# Patient Record
Sex: FEMALE | Race: WHITE | NOT HISPANIC OR LATINO | Employment: UNEMPLOYED | URBAN - METROPOLITAN AREA
[De-identification: names, ages, dates, MRNs, and addresses within clinical notes are randomized per-mention and may not be internally consistent; named-entity substitution may affect disease eponyms.]

---

## 2018-11-30 ENCOUNTER — OFFICE VISIT CONVERTED (OUTPATIENT)
Dept: SURGERY | Facility: CLINIC | Age: 43
End: 2018-11-30
Attending: UROLOGY

## 2021-05-16 VITALS — RESPIRATION RATE: 16 BRPM | BODY MASS INDEX: 22.58 KG/M2 | HEIGHT: 60 IN | WEIGHT: 115 LBS

## 2022-10-25 ENCOUNTER — OFFICE VISIT (OUTPATIENT)
Dept: FAMILY MEDICINE CLINIC | Facility: CLINIC | Age: 47
End: 2022-10-25

## 2022-10-25 VITALS
HEART RATE: 73 BPM | TEMPERATURE: 98 F | OXYGEN SATURATION: 99 % | RESPIRATION RATE: 20 BRPM | WEIGHT: 113.1 LBS | DIASTOLIC BLOOD PRESSURE: 84 MMHG | SYSTOLIC BLOOD PRESSURE: 132 MMHG | HEIGHT: 60 IN | BODY MASS INDEX: 22.2 KG/M2

## 2022-10-25 DIAGNOSIS — F41.9 ANXIETY: ICD-10-CM

## 2022-10-25 DIAGNOSIS — Z13.220 SCREENING FOR LIPID DISORDERS: ICD-10-CM

## 2022-10-25 DIAGNOSIS — Z76.89 ESTABLISHING CARE WITH NEW DOCTOR, ENCOUNTER FOR: Primary | ICD-10-CM

## 2022-10-25 DIAGNOSIS — N95.1 MENOPAUSAL SYMPTOMS: ICD-10-CM

## 2022-10-25 DIAGNOSIS — Z12.31 ENCOUNTER FOR SCREENING MAMMOGRAM FOR MALIGNANT NEOPLASM OF BREAST: ICD-10-CM

## 2022-10-25 DIAGNOSIS — Z87.442 HISTORY OF KIDNEY STONES: ICD-10-CM

## 2022-10-25 DIAGNOSIS — Z01.89 ROUTINE LAB DRAW: ICD-10-CM

## 2022-10-25 PROCEDURE — 99204 OFFICE O/P NEW MOD 45 MIN: CPT

## 2022-10-25 RX ORDER — CITALOPRAM 10 MG/1
5 TABLET ORAL DAILY
Qty: 45 TABLET | Refills: 1 | Status: SHIPPED | OUTPATIENT
Start: 2022-10-25

## 2022-10-25 NOTE — ASSESSMENT & PLAN NOTE
Chronic issue.  Went through menopause in her late 30s.  Still has hot flashes, relatively controlled.

## 2022-10-25 NOTE — ASSESSMENT & PLAN NOTE
Uncontrolled.  We will start back on Celexa at 5 mg.  Discussed with patient may take 3 to 4 weeks to get in system to notice a difference, may consider increasing in future if needed.  Discussed good coping mechanisms, social support, and other ways to deal with anxiety as well.

## 2022-10-25 NOTE — PROGRESS NOTES
Teo Nation presents to [facility] with complaints of worsening anxiety, patient's also here to establish as a new patient.      History of Present Illness  This is a 46-year-old female, past medical history significant for kidney stones, tubal ligation, anxiety, D&C, who presents to clinic today with complaints of worsening anxiety.    Kidney stones: states that she has had a lot of issues with kidney stones in the past, her main time that she had really severe issues was in 2015 in 2016.  States that she had to have 2 rounds of stents being placed because the kidney stones were so large that they could not pass on their own.  States that the last time she had an issue with a kidney stone was back in 2019, it was able to pass on its own though.  States that she tries to stay very hydrated, limits a lot of her calcium intake as the kidney stone that she is had the past have been made of calcium.  States that she not had a lot of issues recently, but does want to mention this as being part of her history.    Anxiety: States that she is always had some anxiety, states that its been going on since the early 2000's.  Originally was placed on Celexa, states that she is only able to tolerate 5 mg as the 10 mg dose was too strong.  States that it really helped her, helped her process anxiety and more appropriate level, states that right now she is got a lot of things going on in her life.  States that her mother-in-law recently passed away, she is had a lot going on with taking care of different family members, and just feels like she is overwhelmed all the time, she is constantly stressed, it is affecting her marriage and her relationships with some of her family members.  Thinks that she needs to go back on the Celexa as it really helped her, does have a good support system, but thinks that she needs this just to help her.  No other acute issues at this time.    Has not had blood  "work done in several years, okay with us obtaining.  Order mammogram today.  We will do annual physical and Pap smear next visit.  We will also discuss Cologuard/colonoscopy at next visit as she will be 47 then.  Otherwise, is up-to-date on preventative screenings.  Does not wish to have any of the vaccines/immunizations at this time.    Past Medical History:   Diagnosis Date   • Anxiety 2018   • Kidney stone 2015     Past Surgical History:   • APPENDECTOMY   • TONSILLECTOMY   • TUBAL ABDOMINAL LIGATION       Social History     Socioeconomic History   • Marital status:    Tobacco Use   • Smoking status: Every Day     Packs/day: 1.00     Years: 10.00     Pack years: 10.00     Types: Cigarettes   • Smokeless tobacco: Never   Vaping Use   • Vaping Use: Never used   Substance and Sexual Activity   • Alcohol use: Not Currently   • Drug use: Never   • Sexual activity: Yes     Partners: Male     Birth control/protection: Post-menopausal     Socioeconomic History   • Marital status:    Tobacco Use   • Smoking status: Every Day     Packs/day: 1.00     Years: 10.00     Pack years: 10.00     Types: Cigarettes   • Smokeless tobacco: Never   Vaping Use   • Vaping Use: Never used   Substance and Sexual Activity   • Alcohol use: Not Currently   • Drug use: Never   • Sexual activity: Yes     Partners: Male     Birth control/protection: Post-menopausal      Problem Relation Age of Onset   • Anxiety disorder Mother    • Arthritis Mother    • COPD Father          Objective   Vital Signs:   /84   Pulse 73   Temp 98 °F (36.7 °C) (Oral)   Resp 20   Ht 152.4 cm (60\")   Wt 51.3 kg (113 lb 1.6 oz)   SpO2 99%   BMI 22.09 kg/m²     Body mass index is 22.09 kg/m².    All labs, imaging, test results, and specialty provider notes reviewed with patient.       Physical Exam  Vitals reviewed.   Constitutional:       Appearance: Normal appearance.   Cardiovascular:      Rate and Rhythm: Normal rate and regular rhythm.      " Pulses: Normal pulses.      Heart sounds: Normal heart sounds.   Pulmonary:      Effort: Pulmonary effort is normal.      Breath sounds: Normal breath sounds.   Neurological:      General: No focal deficit present.      Mental Status: She is alert and oriented to person, place, and time.            Assessment and Plan:  Diagnoses and all orders for this visit:    1. Establishing care with new doctor, encounter for (Primary)    2. History of kidney stones  Assessment & Plan:  No acute issues.  Discussed continuing increased water intake, avoiding foods high in calcium.      3. Menopausal symptoms  Assessment & Plan:  Chronic issue.  Went through menopause in her late 30s.  Still has hot flashes, relatively controlled.      4. Anxiety  Assessment & Plan:  Uncontrolled.  We will start back on Celexa at 5 mg.  Discussed with patient may take 3 to 4 weeks to get in system to notice a difference, may consider increasing in future if needed.  Discussed good coping mechanisms, social support, and other ways to deal with anxiety as well.    Orders:  -     citalopram (CeleXA) 10 MG tablet; Take 0.5 tablets by mouth Daily.  Dispense: 45 tablet; Refill: 1    5. Routine lab draw  -     CBC Auto Differential; Future  -     Comprehensive Metabolic Panel; Future  -     TSH Rfx On Abnormal To Free T4; Future  -     Urinalysis With Culture If Indicated -; Future    6. Screening for lipid disorders  -     Lipid Panel; Future    7. Encounter for screening mammogram for malignant neoplasm of breast  -     Mammo Screening Digital Tomosynthesis Bilateral With CAD; Future        Follow Up:  Return in about 3 months (around 1/25/2023) for Pap Smear, Annual physical.    Patient was given instructions and counseling regarding her condition or for health maintenance advice. Please see specific information pulled into the AVS if appropriate.

## 2022-10-28 ENCOUNTER — PATIENT ROUNDING (BHMG ONLY) (OUTPATIENT)
Dept: FAMILY MEDICINE CLINIC | Facility: CLINIC | Age: 47
End: 2022-10-28

## 2022-10-28 NOTE — PROGRESS NOTES
October 28, 2022    Hello, may I speak with Madelaine Nation?    My name is Yessy       I am  with [facility]  [facility]  [address]   [place] [address] [phone].    Before we get started may I verify your date of birth? 1975    I am calling to officially welcome you to our practice and ask about your recent visit. Is this a good time to talk? yes    Tell me about your visit with us. What things went well?  everything was good.  Staff was helpful       We're always looking for ways to make our patients' experiences even better. Do you have recommendations on ways we may improve?  no    Overall were you satisfied with your first visit to our practice? yes       I appreciate you taking the time to speak with me today. Is there anything else I can do for you? no      Thank you, and have a great day.

## 2022-11-08 ENCOUNTER — OFFICE VISIT (OUTPATIENT)
Dept: FAMILY MEDICINE CLINIC | Facility: CLINIC | Age: 47
End: 2022-11-08

## 2022-11-08 VITALS
DIASTOLIC BLOOD PRESSURE: 72 MMHG | HEART RATE: 93 BPM | RESPIRATION RATE: 17 BRPM | TEMPERATURE: 98 F | SYSTOLIC BLOOD PRESSURE: 126 MMHG | OXYGEN SATURATION: 96 %

## 2022-11-08 DIAGNOSIS — Z01.89 ROUTINE LAB DRAW: ICD-10-CM

## 2022-11-08 DIAGNOSIS — N30.00 ACUTE CYSTITIS WITHOUT HEMATURIA: Primary | ICD-10-CM

## 2022-11-08 DIAGNOSIS — R82.90 FOUL SMELLING URINE: ICD-10-CM

## 2022-11-08 LAB
BILIRUB BLD-MCNC: NEGATIVE MG/DL
CLARITY, POC: ABNORMAL
COLOR UR: YELLOW
EXPIRATION DATE: ABNORMAL
GLUCOSE UR STRIP-MCNC: NEGATIVE MG/DL
KETONES UR QL: ABNORMAL
LEUKOCYTE EST, POC: ABNORMAL
Lab: ABNORMAL
NITRITE UR-MCNC: POSITIVE MG/ML
PH UR: 5.5 [PH] (ref 5–8)
PROT UR STRIP-MCNC: ABNORMAL MG/DL
RBC # UR STRIP: ABNORMAL /UL
SP GR UR: 1.02 (ref 1–1.03)
UROBILINOGEN UR QL: ABNORMAL

## 2022-11-08 PROCEDURE — 99213 OFFICE O/P EST LOW 20 MIN: CPT

## 2022-11-08 PROCEDURE — 87186 SC STD MICRODIL/AGAR DIL: CPT

## 2022-11-08 PROCEDURE — 81003 URINALYSIS AUTO W/O SCOPE: CPT

## 2022-11-08 PROCEDURE — 87086 URINE CULTURE/COLONY COUNT: CPT

## 2022-11-08 PROCEDURE — 87088 URINE BACTERIA CULTURE: CPT

## 2022-11-08 RX ORDER — CEPHALEXIN 500 MG/1
500 CAPSULE ORAL 2 TIMES DAILY
Qty: 14 CAPSULE | Refills: 0 | Status: SHIPPED | OUTPATIENT
Start: 2022-11-08 | End: 2022-12-05

## 2022-11-08 NOTE — PROGRESS NOTES
Madelaine Nation presents to [facility] with complaints of foul odor to urine, fever, concern for UTI.      History of Present Illness  This is a 46-year female who presents to clinic with complaints of foul odor to urine, fever, and concern for UTI.      States that typically when she gets a UTI, she does not have the typical symptoms of burning with urination, frequency with urination, but typically gets a fever ayour son he does have a thing nd will have a really foul odor to her urine.  States that she developed this a day or 2 ago, knew that she likely had a UTI, came in for evaluation today.  Denies any lower back pain, denies any pelvic pain.  Denies any blood in urine.  Denies any other associated symptoms.  Has been taking ibuprofen/Tylenol over-the-counter for management of fever.    The following portions of the patient's history were personally reviewed and updated as appropriate: allergies, current medications, past medical history, past surgical history, past family history, and past social history.       Objective   Vital Signs:   /72   Pulse 93   Temp 98 °F (36.7 °C)   Resp 17   SpO2 96%     There is no height or weight on file to calculate BMI.    All labs, imaging, test results, and specialty provider notes reviewed with patient.     Physical Exam  Vitals reviewed.   Constitutional:       Appearance: Normal appearance.   Cardiovascular:      Rate and Rhythm: Normal rate and regular rhythm.      Pulses: Normal pulses.      Heart sounds: Normal heart sounds.   Pulmonary:      Effort: Pulmonary effort is normal.      Breath sounds: Normal breath sounds.   Neurological:      General: No focal deficit present.      Mental Status: She is alert and oriented to person, place, and time.            Assessment and Plan:  Diagnoses and all orders for this visit:    1. Acute cystitis without hematuria (Primary)  -     cephalexin (Keflex) 500 MG capsule; Take 1 capsule  by mouth 2 (Two) Times a Day.  Dispense: 14 capsule; Refill: 0    2. Foul smelling urine  -     POC Urinalysis Dipstick, Automated  -     Urine Culture - Urine, Urine, Clean Catch    3. Routine lab draw  -     Cancel: Urinalysis With Culture If Indicated - Urine, Clean Catch        Did obtain urinalysis, does show likely UTI.  We will send this off for culture to confirm.  We will go ahead and empirically start patient on Keflex to treat UTI, treat for 7 days.  Did discuss that if urine culture comes back with a different bacteria, may need to switch antibiotics at that time.  Discussed with her to make sure that she is drinking plenty of water, let me know if symptoms do not improve with treatment.      Follow Up:  No follow-ups on file.    Patient was given instructions and counseling regarding her condition or for health maintenance advice. Please see specific information pulled into the AVS if appropriate.

## 2022-11-10 LAB — BACTERIA SPEC AEROBE CULT: ABNORMAL

## 2022-12-02 ENCOUNTER — TELEPHONE (OUTPATIENT)
Dept: FAMILY MEDICINE CLINIC | Facility: CLINIC | Age: 47
End: 2022-12-02

## 2022-12-02 NOTE — TELEPHONE ENCOUNTER
Patient is aware Kaseys out of the office today. Patient is aware that she can try Azo. Patient stated that she gets fevers every time she gets a UTI she is aware that she can rotate tylenol and ibuprofen to help with the fever.  Patient has an appointment on Monday 12/5/2022

## 2022-12-02 NOTE — TELEPHONE ENCOUNTER
Caller: Madelaine Nation    Relationship: Self    Best call back number: [phone]    Who are you requesting to speak with (clinical staff, provider,  specific staff member): MEDICAL STAFF    What was the call regarding: PATIENT IS GETTING ANOTHER URINARY TRACT INFECTION. SHE GETS THEM OFTEN. SHE MADE AN APPOINTMENT FOR Monday AND WOULD LIKE TO KNOW IF THERE IS ANYTHING SHE CAN DO DURING THE WEEKEND FOR THE URINARY TRACT INFECTION. PLEASE CALL PATIENT TO ADVISE.

## 2022-12-05 ENCOUNTER — OFFICE VISIT (OUTPATIENT)
Dept: FAMILY MEDICINE CLINIC | Facility: CLINIC | Age: 47
End: 2022-12-05

## 2022-12-05 VITALS
HEART RATE: 96 BPM | SYSTOLIC BLOOD PRESSURE: 132 MMHG | OXYGEN SATURATION: 98 % | TEMPERATURE: 98 F | RESPIRATION RATE: 19 BRPM | DIASTOLIC BLOOD PRESSURE: 76 MMHG

## 2022-12-05 DIAGNOSIS — R82.90 FOUL SMELLING URINE: ICD-10-CM

## 2022-12-05 DIAGNOSIS — N30.01 ACUTE CYSTITIS WITH HEMATURIA: Primary | ICD-10-CM

## 2022-12-05 LAB
BILIRUB BLD-MCNC: NEGATIVE MG/DL
CLARITY, POC: ABNORMAL
COLOR UR: ABNORMAL
EXPIRATION DATE: ABNORMAL
GLUCOSE UR STRIP-MCNC: NEGATIVE MG/DL
KETONES UR QL: NEGATIVE
LEUKOCYTE EST, POC: ABNORMAL
Lab: ABNORMAL
NITRITE UR-MCNC: POSITIVE MG/ML
PH UR: 6 [PH] (ref 5–8)
PROT UR STRIP-MCNC: ABNORMAL MG/DL
RBC # UR STRIP: ABNORMAL /UL
SP GR UR: 1.02 (ref 1–1.03)
UROBILINOGEN UR QL: ABNORMAL

## 2022-12-05 PROCEDURE — 87086 URINE CULTURE/COLONY COUNT: CPT

## 2022-12-05 PROCEDURE — 81003 URINALYSIS AUTO W/O SCOPE: CPT

## 2022-12-05 PROCEDURE — 99213 OFFICE O/P EST LOW 20 MIN: CPT

## 2022-12-05 PROCEDURE — 87186 SC STD MICRODIL/AGAR DIL: CPT

## 2022-12-05 PROCEDURE — 87077 CULTURE AEROBIC IDENTIFY: CPT

## 2022-12-05 RX ORDER — CIPROFLOXACIN 500 MG/1
500 TABLET, FILM COATED ORAL 2 TIMES DAILY
Qty: 10 TABLET | Refills: 0 | Status: SHIPPED | OUTPATIENT
Start: 2022-12-05 | End: 2022-12-22

## 2022-12-05 NOTE — PROGRESS NOTES
Madelaine Nation presents to [facility] with complaints of foul odor to urine and concern for UTI.      History of Present Illness  This is a 46-year-old female who presents to clinic with complaints of foul odor to urine and concern for UTI.    States that she did recently just have a UTI back at the beginning of November, was treated with Keflex by myself, and states that that did help with her symptoms.  States that this time, her symptoms returned on Friday, states that she is noticed her urine is a lot darker, she has had a foul smell to her urine, and that typically indicates that she is developing a UTI.  Has not had any burning with urination, no frequency, no pelvic or back pain.  Does have extensive history of kidney stones, but is not having any back pain at this current time.  Denies any fever/chills/body aches.  Wanted to get ahead of it, as she knows that typically she will wait too long and her UTIs will get out of control.    The following portions of the patient's history were personally reviewed and updated as appropriate: allergies, current medications, past medical history, past surgical history, past family history, and past social history.       Objective   Vital Signs:   /76   Pulse 96   Temp 98 °F (36.7 °C)   Resp 19   SpO2 98%     There is no height or weight on file to calculate BMI.    All labs, imaging, test results, and specialty provider notes reviewed with patient.     Physical Exam  Vitals reviewed.   Constitutional:       Appearance: Normal appearance.   Cardiovascular:      Rate and Rhythm: Normal rate and regular rhythm.      Pulses: Normal pulses.      Heart sounds: Normal heart sounds.   Pulmonary:      Effort: Pulmonary effort is normal.      Breath sounds: Normal breath sounds.   Neurological:      General: No focal deficit present.      Mental Status: She is alert and oriented to person, place, and time.            Assessment and  Plan:  Diagnoses and all orders for this visit:    1. Acute cystitis with hematuria (Primary)  -     ciprofloxacin (Cipro) 500 MG tablet; Take 1 tablet by mouth 2 (Two) Times a Day.  Dispense: 10 tablet; Refill: 0    2. Foul smelling urine  -     POCT urinalysis dipstick, automated  -     Urine Culture - Urine, Urine, Clean Catch      Urinalysis performed does indicate that patient has a UTI, will go ahead and send off for culture to make sure that we are treating the right organism, but we will go ahead and start empirically on ciprofloxacin as she was recently on Keflex and needs something stronger to treat the UTI.  Discussed with her to take the full course, that she does need to increase her water intake, as she drinks mostly coffee and I think this is contributing to her UTI frequency.  Discussed with her also that she can take cranberry over-the-counter, as it will help with inflammation of the bladder as well.  Can adjust antibiotic if indicated based off results of culture.    Follow Up:  No follow-ups on file.    Patient was given instructions and counseling regarding her condition or for health maintenance advice. Please see specific information pulled into the AVS if appropriate.

## 2022-12-07 ENCOUNTER — TELEPHONE (OUTPATIENT)
Dept: FAMILY MEDICINE CLINIC | Facility: CLINIC | Age: 47
End: 2022-12-07

## 2022-12-07 RX ORDER — NITROFURANTOIN 25; 75 MG/1; MG/1
100 CAPSULE ORAL 2 TIMES DAILY
Qty: 14 CAPSULE | Refills: 0 | Status: SHIPPED | OUTPATIENT
Start: 2022-12-07 | End: 2022-12-22

## 2022-12-07 NOTE — TELEPHONE ENCOUNTER
Caller: Madelaine Nation    Relationship: Self    Best call back number: [phone]  What medications are you currently taking:   Current Outpatient Medications on File Prior to Visit   Medication Sig Dispense Refill   • ciprofloxacin (Cipro) 500 MG tablet Take 1 tablet by mouth 2 (Two) Times a Day. 10 tablet 0   • citalopram (CeleXA) 10 MG tablet Take 0.5 tablets by mouth Daily. 45 tablet 1     No current facility-administered medications on file prior to visit.          When did you start taking these medications: LAST COUPLE OF DAYS     Which medication are you concerned about: CIPROFLOXACIN     Who prescribed you this medication: LEE ALBRECHT     What are your concerns: PATIENT IS EATING BEFORE TAKEN THIS MEDICATION AND IT IS STILL MAKING HER SICK FEELING WANTED TO SEE IF THERE WAS SOMETHING ELSE SHE COULD GET TO TAKE     How long have you had these concerns: SINCE Monday NIGHT

## 2022-12-08 LAB
BACTERIA SPEC AEROBE CULT: ABNORMAL
BACTERIA SPEC AEROBE CULT: ABNORMAL

## 2022-12-15 ENCOUNTER — APPOINTMENT (OUTPATIENT)
Dept: MAMMOGRAPHY | Facility: HOSPITAL | Age: 47
End: 2022-12-15

## 2022-12-22 ENCOUNTER — TELEMEDICINE (OUTPATIENT)
Dept: FAMILY MEDICINE CLINIC | Facility: CLINIC | Age: 47
End: 2022-12-22

## 2022-12-22 DIAGNOSIS — J06.9 UPPER RESPIRATORY TRACT INFECTION, UNSPECIFIED TYPE: Primary | ICD-10-CM

## 2022-12-22 PROCEDURE — 99213 OFFICE O/P EST LOW 20 MIN: CPT | Performed by: NURSE PRACTITIONER

## 2022-12-22 RX ORDER — AZITHROMYCIN 250 MG/1
TABLET, FILM COATED ORAL
Qty: 6 TABLET | Refills: 0 | Status: SHIPPED | OUTPATIENT
Start: 2022-12-22

## 2022-12-22 NOTE — PROGRESS NOTES
Chief Complaint  Nasal Congestion (Yellow with blood) and Ear Fullness    Subjective        Madelaine Nation presents to [facility]  History of Present Illness  Nasal congestion with yellow with some tinge of blood.  States last Tuesday her 7 year old daughter with illness and had been to  in [place].  Treated for symptoms.  States daughter and  with ear infection.  States that ear is popping and has yellow and green         Past History:    Medical History: has a past medical history of Anxiety (2018) and Kidney stone (2015).     Surgical History: has a past surgical history that includes Appendectomy; Tubal ligation; and Tonsillectomy.     Family History: family history includes Anxiety disorder in her mother; Arthritis in her mother; COPD in her father.     Social History: reports that she has been smoking cigarettes. She has a 10.00 pack-year smoking history. She has never used smokeless tobacco. She reports that she does not currently use alcohol. She reports that she does not use drugs.    Allergies: Bactrim [sulfamethoxazole-trimethoprim] and Penicillins          Current Outpatient Medications:   •  azithromycin (Zithromax Z-Sudeep) 250 MG tablet, Take 2 tablets by mouth on day 1, then 1 tablet daily on days 2-5, Disp: 6 tablet, Rfl: 0  •  citalopram (CeleXA) 10 MG tablet, Take 0.5 tablets by mouth Daily., Disp: 45 tablet, Rfl: 1    Medications Discontinued During This Encounter   Medication Reason   • ciprofloxacin (Cipro) 500 MG tablet *Therapy completed   • nitrofurantoin, macrocrystal-monohydrate, (Macrobid) 100 MG capsule *Therapy completed         Review of Systems   Constitutional: Negative for fever.   HENT: Positive for postnasal drip.    Respiratory: Negative for cough and shortness of breath.    Cardiovascular: Negative for chest pain, palpitations and leg swelling.   Neurological: Negative for dizziness, weakness, numbness and headache.        Objective          There were no vitals filed for this visit.  There is no height or weight on file to calculate BMI.         Physical Exam  Constitutional:       Appearance: She is not ill-appearing.   HENT:      Head: Normocephalic.   Pulmonary:      Effort: Pulmonary effort is normal.   Neurological:      Mental Status: She is alert and oriented to person, place, and time.   Psychiatric:         Mood and Affect: Mood normal.         Behavior: Behavior normal.         Thought Content: Thought content normal.         Judgment: Judgment normal.             Result Review :               Assessment and Plan     Diagnoses and all orders for this visit:    1. Upper respiratory tract infection, unspecified type (Primary)  -     azithromycin (Zithromax Z-Sudeep) 250 MG tablet; Take 2 tablets by mouth on day 1, then 1 tablet daily on days 2-5  Dispense: 6 tablet; Refill: 0      Consent given for zoom video visit. Video visit from office with patient at home lasting 15 minutes.        Follow Up     Return if symptoms worsen or fail to improve.    Patient was given instructions and counseling regarding her condition or for health maintenance advice. Please see specific information pulled into the AVS if appropriate.

## 2023-02-07 ENCOUNTER — TELEPHONE (OUTPATIENT)
Dept: FAMILY MEDICINE CLINIC | Facility: CLINIC | Age: 48
End: 2023-02-07

## 2023-02-07 NOTE — TELEPHONE ENCOUNTER
LVMTCB    Patient missed their appointment scheduled on 2/7/23 with Makayla Bragg.  Would patient like to be rescheduled?    No show letter sent to patient either via [employer] or mail.     HUB TO SHARE

## 2023-03-20 ENCOUNTER — TELEPHONE (OUTPATIENT)
Dept: FAMILY MEDICINE CLINIC | Facility: CLINIC | Age: 48
End: 2023-03-20

## 2024-09-25 ENCOUNTER — ANESTHESIA EVENT (OUTPATIENT)
Dept: PERIOP | Facility: HOSPITAL | Age: 49
End: 2024-09-25
Payer: COMMERCIAL

## 2024-09-25 ENCOUNTER — TELEPHONE (OUTPATIENT)
Dept: FAMILY MEDICINE CLINIC | Facility: CLINIC | Age: 49
End: 2024-09-25

## 2024-09-25 ENCOUNTER — HOSPITAL ENCOUNTER (INPATIENT)
Facility: HOSPITAL | Age: 49
LOS: 3 days | Discharge: HOME | End: 2024-09-28
Attending: EMERGENCY MEDICINE | Admitting: UROLOGY
Payer: COMMERCIAL

## 2024-09-25 ENCOUNTER — OFFICE VISIT (OUTPATIENT)
Dept: FAMILY MEDICINE CLINIC | Facility: CLINIC | Age: 49
End: 2024-09-25
Payer: COMMERCIAL

## 2024-09-25 ENCOUNTER — HOSPITAL ENCOUNTER (OUTPATIENT)
Dept: CT IMAGING | Facility: HOSPITAL | Age: 49
Discharge: HOME | End: 2024-09-25
Payer: COMMERCIAL

## 2024-09-25 ENCOUNTER — APPOINTMENT (OUTPATIENT)
Dept: GENERAL RADIOLOGY | Facility: HOSPITAL | Age: 49
End: 2024-09-25
Payer: COMMERCIAL

## 2024-09-25 ENCOUNTER — ANESTHESIA (OUTPATIENT)
Dept: PERIOP | Facility: HOSPITAL | Age: 49
End: 2024-09-25
Payer: COMMERCIAL

## 2024-09-25 VITALS
TEMPERATURE: 100.1 F | DIASTOLIC BLOOD PRESSURE: 70 MMHG | OXYGEN SATURATION: 96 % | HEART RATE: 124 BPM | WEIGHT: 108.7 LBS | BODY MASS INDEX: 21.34 KG/M2 | HEIGHT: 60 IN | SYSTOLIC BLOOD PRESSURE: 108 MMHG

## 2024-09-25 DIAGNOSIS — R11.2 NAUSEA AND VOMITING, UNSPECIFIED VOMITING TYPE: ICD-10-CM

## 2024-09-25 DIAGNOSIS — Z87.442 HISTORY OF KIDNEY STONES: ICD-10-CM

## 2024-09-25 DIAGNOSIS — Z12.4 CERVICAL CANCER SCREENING: ICD-10-CM

## 2024-09-25 DIAGNOSIS — R10.9 RIGHT FLANK PAIN: Primary | ICD-10-CM

## 2024-09-25 DIAGNOSIS — N20.0 KIDNEY STONE: ICD-10-CM

## 2024-09-25 DIAGNOSIS — A41.9 SEPSIS, DUE TO UNSPECIFIED ORGANISM, UNSPECIFIED WHETHER ACUTE ORGAN DYSFUNCTION PRESENT: ICD-10-CM

## 2024-09-25 DIAGNOSIS — Z12.31 ENCOUNTER FOR SCREENING MAMMOGRAM FOR MALIGNANT NEOPLASM OF BREAST: ICD-10-CM

## 2024-09-25 DIAGNOSIS — R10.9 RIGHT FLANK PAIN: ICD-10-CM

## 2024-09-25 DIAGNOSIS — N13.2 HYDRONEPHROSIS WITH URINARY OBSTRUCTION DUE TO URETERAL CALCULUS: Primary | ICD-10-CM

## 2024-09-25 DIAGNOSIS — Z12.11 SCREENING FOR COLON CANCER: ICD-10-CM

## 2024-09-25 LAB
ALBUMIN SERPL-MCNC: 4.3 G/DL (ref 3.5–5.2)
ALBUMIN/GLOB SERPL: 1.5 G/DL
ALP SERPL-CCNC: 68 U/L (ref 39–117)
ALT SERPL W P-5'-P-CCNC: 21 U/L (ref 1–33)
ANION GAP SERPL CALCULATED.3IONS-SCNC: 14.4 MMOL/L (ref 5–15)
AST SERPL-CCNC: 36 U/L (ref 1–32)
B-HCG UR QL: NEGATIVE
BACTERIA UR QL AUTO: ABNORMAL /HPF
BASOPHILS # BLD AUTO: 0.07 10*3/MM3 (ref 0–0.2)
BASOPHILS NFR BLD AUTO: 0.3 % (ref 0–1.5)
BILIRUB BLD-MCNC: NEGATIVE MG/DL
BILIRUB SERPL-MCNC: 0.5 MG/DL (ref 0–1.2)
BILIRUB UR QL STRIP: NEGATIVE
BUN SERPL-MCNC: 17 MG/DL (ref 6–20)
BUN/CREAT SERPL: 16.5 (ref 7–25)
CALCIUM SPEC-SCNC: 9.5 MG/DL (ref 8.6–10.5)
CHLORIDE SERPL-SCNC: 98 MMOL/L (ref 98–107)
CLARITY UR: ABNORMAL
CLARITY, POC: ABNORMAL
CO2 SERPL-SCNC: 22.6 MMOL/L (ref 22–29)
COLOR UR: ABNORMAL
COLOR UR: YELLOW
CREAT SERPL-MCNC: 1.03 MG/DL (ref 0.57–1)
D-LACTATE SERPL-SCNC: 1.6 MMOL/L (ref 0.5–2)
D-LACTATE SERPL-SCNC: 2.5 MMOL/L (ref 0.5–2)
DEPRECATED RDW RBC AUTO: 43.7 FL (ref 37–54)
EGFRCR SERPLBLD CKD-EPI 2021: 68.1 ML/MIN/1.73
EOSINOPHIL # BLD AUTO: 0.18 10*3/MM3 (ref 0–0.4)
EOSINOPHIL NFR BLD AUTO: 0.8 % (ref 0.3–6.2)
ERYTHROCYTE [DISTWIDTH] IN BLOOD BY AUTOMATED COUNT: 13.1 % (ref 12.3–15.4)
EXPIRATION DATE: ABNORMAL
GLOBULIN UR ELPH-MCNC: 2.9 GM/DL
GLUCOSE SERPL-MCNC: 182 MG/DL (ref 65–99)
GLUCOSE UR STRIP-MCNC: NEGATIVE MG/DL
GLUCOSE UR STRIP-MCNC: NEGATIVE MG/DL
HCT VFR BLD AUTO: 39.7 % (ref 34–46.6)
HGB BLD-MCNC: 13.7 G/DL (ref 12–15.9)
HGB UR QL STRIP.AUTO: ABNORMAL
HOLD SPECIMEN: NORMAL
HOLD SPECIMEN: NORMAL
HYALINE CASTS UR QL AUTO: ABNORMAL /LPF
IMM GRANULOCYTES # BLD AUTO: 0.44 10*3/MM3 (ref 0–0.05)
IMM GRANULOCYTES NFR BLD AUTO: 2 % (ref 0–0.5)
KETONES UR QL STRIP: ABNORMAL
KETONES UR QL: NEGATIVE
LEUKOCYTE EST, POC: ABNORMAL
LEUKOCYTE ESTERASE UR QL STRIP.AUTO: ABNORMAL
LIPASE SERPL-CCNC: 17 U/L (ref 13–60)
LYMPHOCYTES # BLD AUTO: 1.19 10*3/MM3 (ref 0.7–3.1)
LYMPHOCYTES NFR BLD AUTO: 5.5 % (ref 19.6–45.3)
Lab: ABNORMAL
MCH RBC QN AUTO: 31.2 PG (ref 26.6–33)
MCHC RBC AUTO-ENTMCNC: 34.5 G/DL (ref 31.5–35.7)
MCV RBC AUTO: 90.4 FL (ref 79–97)
MONOCYTES # BLD AUTO: 1.28 10*3/MM3 (ref 0.1–0.9)
MONOCYTES NFR BLD AUTO: 5.9 % (ref 5–12)
NEUTROPHILS NFR BLD AUTO: 18.65 10*3/MM3 (ref 1.7–7)
NEUTROPHILS NFR BLD AUTO: 85.5 % (ref 42.7–76)
NITRITE UR QL STRIP: POSITIVE
NITRITE UR-MCNC: NEGATIVE MG/ML
NRBC BLD AUTO-RTO: 0 /100 WBC (ref 0–0.2)
PH UR STRIP.AUTO: 6 [PH] (ref 5–8)
PH UR: 7 [PH] (ref 5–8)
PLATELET # BLD AUTO: 117 10*3/MM3 (ref 140–450)
PMV BLD AUTO: 12.2 FL (ref 6–12)
POTASSIUM SERPL-SCNC: 3.5 MMOL/L (ref 3.5–5.2)
PROT SERPL-MCNC: 7.2 G/DL (ref 6–8.5)
PROT UR QL STRIP: ABNORMAL
PROT UR STRIP-MCNC: ABNORMAL MG/DL
RBC # BLD AUTO: 4.39 10*6/MM3 (ref 3.77–5.28)
RBC # UR STRIP: ABNORMAL /HPF
RBC # UR STRIP: ABNORMAL /UL
REF LAB TEST METHOD: ABNORMAL
SODIUM SERPL-SCNC: 135 MMOL/L (ref 136–145)
SP GR UR STRIP: 1.02 (ref 1–1.03)
SP GR UR: 1.02 (ref 1–1.03)
SQUAMOUS #/AREA URNS HPF: ABNORMAL /HPF
UROBILINOGEN UR QL STRIP: ABNORMAL
UROBILINOGEN UR QL: ABNORMAL
WBC # UR STRIP: ABNORMAL /HPF
WBC NRBC COR # BLD AUTO: 21.81 10*3/MM3 (ref 3.4–10.8)
WHOLE BLOOD HOLD COAG: NORMAL
WHOLE BLOOD HOLD SPECIMEN: NORMAL

## 2024-09-25 PROCEDURE — 36415 COLL VENOUS BLD VENIPUNCTURE: CPT

## 2024-09-25 PROCEDURE — 25810000003 SODIUM CHLORIDE 0.9 % SOLUTION

## 2024-09-25 PROCEDURE — 80053 COMPREHEN METABOLIC PANEL: CPT | Performed by: EMERGENCY MEDICINE

## 2024-09-25 PROCEDURE — 25810000003 SODIUM CHLORIDE 0.9 % SOLUTION: Performed by: ANESTHESIOLOGY

## 2024-09-25 PROCEDURE — 25010000002 MEPERIDINE PER 100 MG: Performed by: ANESTHESIOLOGY

## 2024-09-25 PROCEDURE — C2617 STENT, NON-COR, TEM W/O DEL: HCPCS | Performed by: UROLOGY

## 2024-09-25 PROCEDURE — 0T768DZ DILATION OF RIGHT URETER WITH INTRALUMINAL DEVICE, VIA NATURAL OR ARTIFICIAL OPENING ENDOSCOPIC: ICD-10-PCS | Performed by: UROLOGY

## 2024-09-25 PROCEDURE — 83605 ASSAY OF LACTIC ACID: CPT

## 2024-09-25 PROCEDURE — 25810000003 SODIUM CHLORIDE 0.9 % SOLUTION: Performed by: UROLOGY

## 2024-09-25 PROCEDURE — 99213 OFFICE O/P EST LOW 20 MIN: CPT

## 2024-09-25 PROCEDURE — 25010000002 KETOROLAC TROMETHAMINE PER 15 MG

## 2024-09-25 PROCEDURE — 25510000001 IOPAMIDOL PER 1 ML: Performed by: UROLOGY

## 2024-09-25 PROCEDURE — 87186 SC STD MICRODIL/AGAR DIL: CPT

## 2024-09-25 PROCEDURE — 25010000002 PROPOFOL 10 MG/ML EMULSION: Performed by: ANESTHESIOLOGY

## 2024-09-25 PROCEDURE — 99291 CRITICAL CARE FIRST HOUR: CPT

## 2024-09-25 PROCEDURE — 96372 THER/PROPH/DIAG INJ SC/IM: CPT

## 2024-09-25 PROCEDURE — 25010000002 DEXAMETHASONE PER 1 MG: Performed by: ANESTHESIOLOGY

## 2024-09-25 PROCEDURE — C1758 CATHETER, URETERAL: HCPCS | Performed by: UROLOGY

## 2024-09-25 PROCEDURE — 25810000003 SODIUM CHLORIDE 0.9 % SOLUTION: Performed by: INTERNAL MEDICINE

## 2024-09-25 PROCEDURE — 99284 EMERGENCY DEPT VISIT MOD MDM: CPT | Performed by: UROLOGY

## 2024-09-25 PROCEDURE — 87077 CULTURE AEROBIC IDENTIFY: CPT

## 2024-09-25 PROCEDURE — 25010000002 MIDAZOLAM PER 1MG: Performed by: ANESTHESIOLOGY

## 2024-09-25 PROCEDURE — 52332 CYSTOSCOPY AND TREATMENT: CPT | Performed by: UROLOGY

## 2024-09-25 PROCEDURE — 25010000002 CEFTRIAXONE PER 250 MG

## 2024-09-25 PROCEDURE — 87086 URINE CULTURE/COLONY COUNT: CPT

## 2024-09-25 PROCEDURE — 94799 UNLISTED PULMONARY SVC/PX: CPT

## 2024-09-25 PROCEDURE — 94761 N-INVAS EAR/PLS OXIMETRY MLT: CPT

## 2024-09-25 PROCEDURE — 99223 1ST HOSP IP/OBS HIGH 75: CPT | Performed by: INTERNAL MEDICINE

## 2024-09-25 PROCEDURE — 85025 COMPLETE CBC W/AUTO DIFF WBC: CPT | Performed by: EMERGENCY MEDICINE

## 2024-09-25 PROCEDURE — 25010000002 ONDANSETRON PER 1 MG: Performed by: ANESTHESIOLOGY

## 2024-09-25 PROCEDURE — 25010000002 FENTANYL CITRATE (PF) 50 MCG/ML SOLUTION: Performed by: ANESTHESIOLOGY

## 2024-09-25 PROCEDURE — 81003 URINALYSIS AUTO W/O SCOPE: CPT

## 2024-09-25 PROCEDURE — 25010000002 ONDANSETRON PER 1 MG

## 2024-09-25 PROCEDURE — 76000 FLUOROSCOPY <1 HR PHYS/QHP: CPT

## 2024-09-25 PROCEDURE — C1769 GUIDE WIRE: HCPCS | Performed by: UROLOGY

## 2024-09-25 PROCEDURE — 83690 ASSAY OF LIPASE: CPT | Performed by: EMERGENCY MEDICINE

## 2024-09-25 PROCEDURE — BT1D1ZZ FLUOROSCOPY OF RIGHT KIDNEY, URETER AND BLADDER USING LOW OSMOLAR CONTRAST: ICD-10-PCS | Performed by: UROLOGY

## 2024-09-25 PROCEDURE — 81025 URINE PREGNANCY TEST: CPT | Performed by: EMERGENCY MEDICINE

## 2024-09-25 PROCEDURE — 74176 CT ABD & PELVIS W/O CONTRAST: CPT

## 2024-09-25 PROCEDURE — 81001 URINALYSIS AUTO W/SCOPE: CPT | Performed by: EMERGENCY MEDICINE

## 2024-09-25 PROCEDURE — 87040 BLOOD CULTURE FOR BACTERIA: CPT

## 2024-09-25 DEVICE — URETERAL STENT
Type: IMPLANTABLE DEVICE | Site: BLADDER | Status: FUNCTIONAL
Brand: ASCERTA™

## 2024-09-25 RX ORDER — SODIUM CHLORIDE, SODIUM LACTATE, POTASSIUM CHLORIDE, CALCIUM CHLORIDE 600; 310; 30; 20 MG/100ML; MG/100ML; MG/100ML; MG/100ML
9 INJECTION, SOLUTION INTRAVENOUS CONTINUOUS PRN
Status: DISCONTINUED | OUTPATIENT
Start: 2024-09-25 | End: 2024-09-25 | Stop reason: HOSPADM

## 2024-09-25 RX ORDER — ACETAMINOPHEN 500 MG
1000 TABLET ORAL ONCE
Status: DISCONTINUED | OUTPATIENT
Start: 2024-09-25 | End: 2024-09-25 | Stop reason: SDUPTHER

## 2024-09-25 RX ORDER — DEXAMETHASONE SODIUM PHOSPHATE 4 MG/ML
INJECTION, SOLUTION INTRA-ARTICULAR; INTRALESIONAL; INTRAMUSCULAR; INTRAVENOUS; SOFT TISSUE AS NEEDED
Status: DISCONTINUED | OUTPATIENT
Start: 2024-09-25 | End: 2024-09-25 | Stop reason: SURG

## 2024-09-25 RX ORDER — ACETAMINOPHEN 325 MG/1
975 TABLET ORAL ONCE
Status: DISCONTINUED | OUTPATIENT
Start: 2024-09-25 | End: 2024-09-25

## 2024-09-25 RX ORDER — SODIUM CHLORIDE 0.9 % (FLUSH) 0.9 %
10 SYRINGE (ML) INJECTION AS NEEDED
Status: DISCONTINUED | OUTPATIENT
Start: 2024-09-25 | End: 2024-09-28 | Stop reason: HOSPADM

## 2024-09-25 RX ORDER — MIDAZOLAM HYDROCHLORIDE 2 MG/2ML
2 INJECTION, SOLUTION INTRAMUSCULAR; INTRAVENOUS ONCE
Status: COMPLETED | OUTPATIENT
Start: 2024-09-25 | End: 2024-09-25

## 2024-09-25 RX ORDER — ACETAMINOPHEN 500 MG
1000 TABLET ORAL ONCE
Status: COMPLETED | OUTPATIENT
Start: 2024-09-25 | End: 2024-09-25

## 2024-09-25 RX ORDER — IOPAMIDOL 510 MG/ML
INJECTION, SOLUTION INTRAVASCULAR AS NEEDED
Status: DISCONTINUED | OUTPATIENT
Start: 2024-09-25 | End: 2024-09-25 | Stop reason: HOSPADM

## 2024-09-25 RX ORDER — PROPOFOL 10 MG/ML
VIAL (ML) INTRAVENOUS AS NEEDED
Status: DISCONTINUED | OUTPATIENT
Start: 2024-09-25 | End: 2024-09-25 | Stop reason: SURG

## 2024-09-25 RX ORDER — SODIUM CHLORIDE 9 MG/ML
100 INJECTION, SOLUTION INTRAVENOUS CONTINUOUS
Status: DISCONTINUED | OUTPATIENT
Start: 2024-09-25 | End: 2024-09-26

## 2024-09-25 RX ORDER — ONDANSETRON 2 MG/ML
4 INJECTION INTRAMUSCULAR; INTRAVENOUS ONCE
Status: DISCONTINUED | OUTPATIENT
Start: 2024-09-25 | End: 2024-09-25 | Stop reason: SDUPTHER

## 2024-09-25 RX ORDER — OXYCODONE HYDROCHLORIDE 5 MG/1
5 TABLET ORAL
Status: DISCONTINUED | OUTPATIENT
Start: 2024-09-25 | End: 2024-09-25 | Stop reason: HOSPADM

## 2024-09-25 RX ORDER — ONDANSETRON 4 MG/1
4 TABLET, ORALLY DISINTEGRATING ORAL EVERY 8 HOURS PRN
Qty: 30 TABLET | Refills: 0 | Status: ON HOLD | OUTPATIENT
Start: 2024-09-25 | End: 2024-09-26

## 2024-09-25 RX ORDER — ONDANSETRON 2 MG/ML
INJECTION INTRAMUSCULAR; INTRAVENOUS AS NEEDED
Status: DISCONTINUED | OUTPATIENT
Start: 2024-09-25 | End: 2024-09-25 | Stop reason: SURG

## 2024-09-25 RX ORDER — SCOLOPAMINE TRANSDERMAL SYSTEM 1 MG/1
1 PATCH, EXTENDED RELEASE TRANSDERMAL ONCE
Status: DISCONTINUED | OUTPATIENT
Start: 2024-09-25 | End: 2024-09-25

## 2024-09-25 RX ORDER — KETOROLAC TROMETHAMINE 30 MG/ML
30 INJECTION, SOLUTION INTRAMUSCULAR; INTRAVENOUS ONCE
Status: COMPLETED | OUTPATIENT
Start: 2024-09-25 | End: 2024-09-25

## 2024-09-25 RX ORDER — SODIUM CHLORIDE 0.9 % (FLUSH) 0.9 %
10 SYRINGE (ML) INJECTION EVERY 12 HOURS SCHEDULED
Status: DISCONTINUED | OUTPATIENT
Start: 2024-09-25 | End: 2024-09-28 | Stop reason: HOSPADM

## 2024-09-25 RX ORDER — ONDANSETRON 2 MG/ML
4 INJECTION INTRAMUSCULAR; INTRAVENOUS ONCE
Status: COMPLETED | OUTPATIENT
Start: 2024-09-25 | End: 2024-09-25

## 2024-09-25 RX ORDER — PROMETHAZINE HYDROCHLORIDE 12.5 MG/1
25 TABLET ORAL ONCE AS NEEDED
Status: DISCONTINUED | OUTPATIENT
Start: 2024-09-25 | End: 2024-09-25 | Stop reason: HOSPADM

## 2024-09-25 RX ORDER — ENOXAPARIN SODIUM 100 MG/ML
30 INJECTION SUBCUTANEOUS DAILY
Status: DISCONTINUED | OUTPATIENT
Start: 2024-09-26 | End: 2024-09-28 | Stop reason: HOSPADM

## 2024-09-25 RX ORDER — ONDANSETRON 2 MG/ML
4 INJECTION INTRAMUSCULAR; INTRAVENOUS ONCE AS NEEDED
Status: DISCONTINUED | OUTPATIENT
Start: 2024-09-25 | End: 2024-09-25 | Stop reason: HOSPADM

## 2024-09-25 RX ORDER — CEPHALEXIN 500 MG/1
500 CAPSULE ORAL 2 TIMES DAILY
Qty: 14 CAPSULE | Refills: 0 | Status: ON HOLD | OUTPATIENT
Start: 2024-09-25 | End: 2024-09-26

## 2024-09-25 RX ORDER — IBUPROFEN 200 MG
200 TABLET ORAL EVERY 6 HOURS PRN
Status: ON HOLD | COMMUNITY
End: 2024-09-26

## 2024-09-25 RX ORDER — PROMETHAZINE HYDROCHLORIDE 25 MG/1
25 SUPPOSITORY RECTAL ONCE AS NEEDED
Status: DISCONTINUED | OUTPATIENT
Start: 2024-09-25 | End: 2024-09-25 | Stop reason: HOSPADM

## 2024-09-25 RX ORDER — OXYCODONE AND ACETAMINOPHEN 5; 325 MG/1; MG/1
1 TABLET ORAL EVERY 8 HOURS PRN
Status: DISCONTINUED | OUTPATIENT
Start: 2024-09-25 | End: 2024-09-28 | Stop reason: HOSPADM

## 2024-09-25 RX ORDER — SODIUM CHLORIDE 9 MG/ML
INJECTION, SOLUTION INTRAVENOUS CONTINUOUS PRN
Status: DISCONTINUED | OUTPATIENT
Start: 2024-09-25 | End: 2024-09-25 | Stop reason: SURG

## 2024-09-25 RX ORDER — LIDOCAINE HYDROCHLORIDE 20 MG/ML
INJECTION, SOLUTION EPIDURAL; INFILTRATION; INTRACAUDAL; PERINEURAL AS NEEDED
Status: DISCONTINUED | OUTPATIENT
Start: 2024-09-25 | End: 2024-09-25 | Stop reason: SURG

## 2024-09-25 RX ORDER — MEPERIDINE HYDROCHLORIDE 25 MG/ML
12.5 INJECTION INTRAMUSCULAR; INTRAVENOUS; SUBCUTANEOUS EVERY 6 HOURS PRN
Status: DISPENSED | OUTPATIENT
Start: 2024-09-25 | End: 2024-09-27

## 2024-09-25 RX ORDER — FENTANYL CITRATE 50 UG/ML
INJECTION, SOLUTION INTRAMUSCULAR; INTRAVENOUS AS NEEDED
Status: DISCONTINUED | OUTPATIENT
Start: 2024-09-25 | End: 2024-09-25 | Stop reason: SURG

## 2024-09-25 RX ORDER — SODIUM CHLORIDE 9 MG/ML
40 INJECTION, SOLUTION INTRAVENOUS AS NEEDED
Status: DISCONTINUED | OUTPATIENT
Start: 2024-09-25 | End: 2024-09-28 | Stop reason: HOSPADM

## 2024-09-25 RX ADMIN — SODIUM CHLORIDE: 9 INJECTION, SOLUTION INTRAVENOUS at 19:49

## 2024-09-25 RX ADMIN — SODIUM CHLORIDE 100 ML/HR: 9 INJECTION, SOLUTION INTRAVENOUS at 21:54

## 2024-09-25 RX ADMIN — PROPOFOL 80 MG: 10 INJECTION, EMULSION INTRAVENOUS at 19:57

## 2024-09-25 RX ADMIN — PROPOFOL 100 MCG/KG/MIN: 10 INJECTION, EMULSION INTRAVENOUS at 19:59

## 2024-09-25 RX ADMIN — MEPERIDINE HYDROCHLORIDE 12.5 MG: 25 INJECTION INTRAMUSCULAR; INTRAVENOUS; SUBCUTANEOUS at 20:33

## 2024-09-25 RX ADMIN — ONDANSETRON 4 MG: 2 INJECTION INTRAMUSCULAR; INTRAVENOUS at 16:15

## 2024-09-25 RX ADMIN — FENTANYL CITRATE 50 MCG: 50 INJECTION, SOLUTION INTRAMUSCULAR; INTRAVENOUS at 20:10

## 2024-09-25 RX ADMIN — SCOPALAMINE 1 PATCH: 1 PATCH, EXTENDED RELEASE TRANSDERMAL at 19:44

## 2024-09-25 RX ADMIN — CEFTRIAXONE SODIUM 1000 MG: 1 INJECTION, POWDER, FOR SOLUTION INTRAMUSCULAR; INTRAVENOUS at 18:01

## 2024-09-25 RX ADMIN — ACETAMINOPHEN 1000 MG: 500 TABLET ORAL at 20:49

## 2024-09-25 RX ADMIN — MIDAZOLAM HYDROCHLORIDE 2 MG: 1 INJECTION, SOLUTION INTRAMUSCULAR; INTRAVENOUS at 19:45

## 2024-09-25 RX ADMIN — FENTANYL CITRATE 50 MCG: 50 INJECTION, SOLUTION INTRAMUSCULAR; INTRAVENOUS at 19:56

## 2024-09-25 RX ADMIN — KETOROLAC TROMETHAMINE 30 MG: 30 INJECTION, SOLUTION INTRAMUSCULAR; INTRAVENOUS at 16:15

## 2024-09-25 RX ADMIN — SODIUM CHLORIDE 1000 ML: 9 INJECTION, SOLUTION INTRAVENOUS at 19:23

## 2024-09-25 RX ADMIN — ONDANSETRON HYDROCHLORIDE 4 MG: 2 SOLUTION INTRAMUSCULAR; INTRAVENOUS at 19:59

## 2024-09-25 RX ADMIN — LIDOCAINE HYDROCHLORIDE 20 MG: 20 INJECTION, SOLUTION INTRAVENOUS at 19:57

## 2024-09-25 RX ADMIN — DEXAMETHASONE SODIUM PHOSPHATE 4 MG: 4 INJECTION, SOLUTION INTRAMUSCULAR; INTRAVENOUS at 19:59

## 2024-09-25 RX ADMIN — SODIUM CHLORIDE 1000 ML: 9 INJECTION, SOLUTION INTRAVENOUS at 16:16

## 2024-09-25 RX ADMIN — Medication 10 ML: at 21:54

## 2024-09-25 NOTE — ED PROVIDER NOTES
Time: 4:11 PM EDT  Date of encounter:  9/25/2024  Independent Historian/Clinical History and Information was obtained by:   Patient    History is limited by: N/A    Chief Complaint   Patient presents with    Flank Pain     Right side flank pain, vomiting, states she had CT scan today at 1230 and was sent her for obstructing kidney stones    Vomiting         History of Present Illness:  Patient is a 48 y.o. year old female who presents to the emergency department for evaluation of fever and right flank pain since Monday.  Patient states Tmax is 101.  She has been taking Tylenol/ Motrin with last dose of Tylenol around 1:30 PM today.  She has a history of kidney stones and has had to have prior stents in the left side.  She currently rates her pain a 0 out of 10.  She had a CT scan done outpatient that showed obstructed kidney stone and was called by her PCP to come to the ED.  She admits to nausea and vomiting denies dysuria and hematuria.    Patient Care Team  Primary Care Provider: Makayla Bragg APRN    Past Medical History:     Allergies   Allergen Reactions    Bactrim [Sulfamethoxazole-Trimethoprim] Hives and Rash    Penicillins Rash     Past Medical History:   Diagnosis Date    Anxiety 2018    Kidney stone 2015     Past Surgical History:   Procedure Laterality Date    APPENDECTOMY      TONSILLECTOMY      TUBAL ABDOMINAL LIGATION       Family History   Problem Relation Age of Onset    Anxiety disorder Mother     Arthritis Mother     COPD Father        Home Medications:  Prior to Admission medications    Medication Sig Start Date End Date Taking? Authorizing Provider   cephalexin (Keflex) 500 MG capsule Take 1 capsule by mouth 2 (Two) Times a Day. 9/25/24   Makayla Bragg APRN   ibuprofen (ADVIL,MOTRIN) 200 MG tablet Take 1 tablet by mouth Every 6 (Six) Hours As Needed for Mild Pain.    Provider, MD Nigel   ondansetron ODT (ZOFRAN-ODT) 4 MG disintegrating tablet Place 1 tablet on the tongue Every  "8 (Eight) Hours As Needed for Nausea. 9/25/24   Makayla Bragg APRN   azithromycin (Zithromax Z-Sudeep) 250 MG tablet Take 2 tablets by mouth on day 1, then 1 tablet daily on days 2-5  Patient not taking: Reported on 9/25/2024 12/22/22 9/25/24  Richard Patel APRN   citalopram (CeleXA) 10 MG tablet Take 0.5 tablets by mouth Daily.  Patient not taking: Reported on 9/25/2024 10/25/22 9/25/24  Makayla Bragg APRN        Social History:   Social History     Tobacco Use    Smoking status: Every Day     Current packs/day: 1.00     Average packs/day: 1 pack/day for 10.0 years (10.0 ttl pk-yrs)     Types: Cigarettes     Passive exposure: Never    Smokeless tobacco: Never   Vaping Use    Vaping status: Never Used   Substance Use Topics    Alcohol use: Not Currently    Drug use: Never         Review of Systems:  Review of Systems   Constitutional:  Positive for fever.   HENT: Negative.     Eyes: Negative.    Respiratory: Negative.     Cardiovascular: Negative.    Gastrointestinal:  Positive for nausea and vomiting. Negative for abdominal pain.   Endocrine: Negative.    Genitourinary:  Positive for flank pain. Negative for dysuria and hematuria.   Skin: Negative.    Allergic/Immunologic: Negative.    Neurological: Negative.    Hematological: Negative.    Psychiatric/Behavioral: Negative.          Physical Exam:  /61   Pulse 92   Temp 99.8 °F (37.7 °C) (Oral)   Resp 20   Ht 152.4 cm (60\")   Wt 49.9 kg (110 lb 0.2 oz)   SpO2 97%   BMI 21.48 kg/m²         Physical Exam  Vitals and nursing note reviewed.   Constitutional:       Appearance: Normal appearance.   HENT:      Head: Normocephalic and atraumatic.      Nose: Nose normal.      Mouth/Throat:      Mouth: Mucous membranes are moist.   Eyes:      Extraocular Movements: Extraocular movements intact.      Conjunctiva/sclera: Conjunctivae normal.      Pupils: Pupils are equal, round, and reactive to light.   Cardiovascular:      Rate and Rhythm: Normal rate " and regular rhythm.      Heart sounds: Normal heart sounds.   Pulmonary:      Effort: Pulmonary effort is normal.      Breath sounds: Normal breath sounds.   Abdominal:      General: Abdomen is flat. Bowel sounds are normal.      Palpations: Abdomen is soft.      Tenderness: There is no abdominal tenderness. There is right CVA tenderness (Mild). There is no left CVA tenderness or guarding.   Musculoskeletal:         General: Normal range of motion.      Cervical back: Normal range of motion and neck supple.   Skin:     General: Skin is warm and dry.   Neurological:      General: No focal deficit present.      Mental Status: She is alert and oriented to person, place, and time.   Psychiatric:         Mood and Affect: Mood normal.         Behavior: Behavior normal.                    Procedures:  Procedures      Medical Decision Making:      Comorbidities that affect care:    Anxiety, kidney stone    External Notes reviewed:    Previous Clinic Note: Office visit PCP 9/25/2024 for right flank pain and Previous Radiological Studies: CT abdomen pelvis from 9/25/2024 showing moderate-severe obstructive uropathy on the right with hydronephrosis, hydroureter due to 0.6 cm stone in the distal right ureter      The following orders were placed and all results were independently analyzed by me:  Orders Placed This Encounter   Procedures    Blood Culture - Blood,    Blood Culture - Blood,    [place] Draw    Comprehensive Metabolic Panel    Lipase    Urinalysis With Microscopic If Indicated (No Culture) - Urine, Clean Catch    CBC Auto Differential    Lactic Acid, Plasma    STAT Lactic Acid, Reflex    Urinalysis, Microscopic Only - Urine, Clean Catch    Pregnancy, Urine - Urine, Clean Catch    NPO Diet NPO Type: Strict NPO    Undress & Gown    Obtain Informed Consent    Inpatient Urology Consult    Inpatient Hospitalist Consult    Insert Peripheral IV    CBC & Differential    Green Top (Gel)    Lavender Top    Gold Top - SST     Light Blue Top       Medications Given in the Emergency Department:  Medications   sodium chloride 0.9 % flush 10 mL (has no administration in time range)   sodium chloride 0.9 % bolus 1,000 mL (has no administration in time range)   sodium chloride 0.9 % bolus 1,000 mL (1,000 mL Intravenous New Bag 9/25/24 1616)   ondansetron (ZOFRAN) injection 4 mg (4 mg Intravenous Given 9/25/24 1615)   ketorolac (TORADOL) injection 30 mg (30 mg Intravenous Given 9/25/24 1615)   cefTRIAXone (ROCEPHIN) 1,000 mg in sodium chloride 0.9 % 100 mL IVPB-VTB (1,000 mg Intravenous New Bag 9/25/24 1801)        ED Course:    The patient was initially evaluated in the triage area where orders were placed. The patient was later dispositioned by Elaine Woodward PA-C.      The patient was advised to stay for completion of workup which includes but is not limited to communication of labs and radiological results, reassessment and plan. The patient was advised that leaving prior to disposition by a provider could result in critical findings that are not communicated to the patient.     ED Course as of 09/25/24 1919   Wed Sep 25, 2024   1758 CT from today:  There is moderate to severe obstructive uropathy on the right with hydronephrosis, hydroureter, and perinephric stranding due to a 0.6 cm stone in the distal right ureter.     Bilateral nephrolithiasis measuring up to 0.6 cm on the right and 0.7 cm on the left.      [AJ]      ED Course User Index  [AJ] Elaine Woodward PA-C       Labs:    Lab Results (last 24 hours)       Procedure Component Value Units Date/Time    POCT urinalysis dipstick, automated [[id number]]  (Abnormal) Collected: 09/25/24 1130    Specimen: Urine Updated: 09/25/24 1131     Color Dark Yellow     Clarity, UA Cloudy     Specific Gravity  1.020     pH, Urine 7.0     Leukocytes Small (1+)     Nitrite, UA Negative     Protein,  mg/dL mg/dL      Glucose, UA Negative mg/dL      Ketones, UA Negative     Urobilinogen,  UA 0.2 E.U./dL     Bilirubin Negative     Blood, UA Large     Lot Number [id number]     Expiration Date 12/31/2024    Urine Culture - Urine, Urine, Clean Catch [[id number]] Collected: 09/25/24 1310    Specimen: Urine, Clean Catch Updated: 09/25/24 1310    CBC & Differential [[id number]]  (Abnormal) Collected: 09/25/24 1548    Specimen: Blood Updated: 09/25/24 1600    Narrative:      The following orders were created for panel order CBC & Differential.  Procedure                               Abnormality         Status                     ---------                               -----------         ------                     CBC Auto Differential[[id number]]        Abnormal            Final result                 Please view results for these tests on the individual orders.    Comprehensive Metabolic Panel [[id number]]  (Abnormal) Collected: 09/25/24 1548    Specimen: Blood Updated: 09/25/24 1618     Glucose 182 mg/dL      BUN 17 mg/dL      Creatinine 1.03 mg/dL      Sodium 135 mmol/L      Potassium 3.5 mmol/L      Chloride 98 mmol/L      CO2 22.6 mmol/L      Calcium 9.5 mg/dL      Total Protein 7.2 g/dL      Albumin 4.3 g/dL      ALT (SGPT) 21 U/L      AST (SGOT) 36 U/L      Alkaline Phosphatase 68 U/L      Total Bilirubin 0.5 mg/dL      Globulin 2.9 gm/dL      A/G Ratio 1.5 g/dL      BUN/Creatinine Ratio 16.5     Anion Gap 14.4 mmol/L      eGFR 68.1 mL/min/1.73     Narrative:      GFR Normal >60  Chronic Kidney Disease <60  Kidney Failure <15      Lipase [[id number]]  (Normal) Collected: 09/25/24 1548    Specimen: Blood Updated: 09/25/24 1618     Lipase 17 U/L     CBC Auto Differential [[id number]]  (Abnormal) Collected: 09/25/24 1548    Specimen: Blood Updated: 09/25/24 1600     WBC 21.81 10*3/mm3      RBC 4.39 10*6/mm3      Hemoglobin 13.7 g/dL      Hematocrit 39.7 %      MCV 90.4 fL      MCH 31.2 pg      MCHC 34.5 g/dL      RDW 13.1 %      RDW-SD 43.7 fl      MPV 12.2 fL      Platelets 117 10*3/mm3      Neutrophil % 85.5  %      Lymphocyte % 5.5 %      Monocyte % 5.9 %      Eosinophil % 0.8 %      Basophil % 0.3 %      Immature Grans % 2.0 %      Neutrophils, Absolute 18.65 10*3/mm3      Lymphocytes, Absolute 1.19 10*3/mm3      Monocytes, Absolute 1.28 10*3/mm3      Eosinophils, Absolute 0.18 10*3/mm3      Basophils, Absolute 0.07 10*3/mm3      Immature Grans, Absolute 0.44 10*3/mm3      nRBC 0.0 /100 WBC     Blood Culture - Blood, Arm, Left [[id number]] Collected: 09/25/24 1631    Specimen: Blood from Arm, Left Updated: 09/25/24 1635    Blood Culture - Blood, Arm, Left [[id number]] Collected: 09/25/24 1631    Specimen: Blood from Arm, Left Updated: 09/25/24 1635    Lactic Acid, Plasma [[id number]]  (Abnormal) Collected: 09/25/24 1631    Specimen: Blood Updated: 09/25/24 1714     Lactate 2.5 mmol/L     Urinalysis With Microscopic If Indicated (No Culture) - Urine, Clean Catch [[id number]]  (Abnormal) Collected: 09/25/24 1720    Specimen: Urine, Clean Catch Updated: 09/25/24 1742     Color, UA Yellow     Appearance, UA Turbid     pH, UA 6.0     Specific Gravity, UA 1.016     Glucose, UA Negative     Ketones, UA Trace     Bilirubin, UA Negative     Blood, UA Large (3+)     Protein,  mg/dL (2+)     Leuk Esterase, UA Large (3+)     Nitrite, UA Positive     Urobilinogen, UA 1.0 E.U./dL    Urinalysis, Microscopic Only - Urine, Clean Catch [[id number]]  (Abnormal) Collected: 09/25/24 1720    Specimen: Urine, Clean Catch Updated: 09/25/24 1742     RBC, UA Too Numerous to Count /HPF      WBC, UA Too Numerous to Count /HPF      Bacteria, UA 1+ /HPF      Squamous Epithelial Cells, UA 0-2 /HPF      Hyaline Casts, UA 0-2 /LPF      Methodology Automated Microscopy             Imaging:    CT Abdomen Pelvis Stone Protocol    Result Date: 9/25/2024  CT ABDOMEN PELVIS STONE PROTOCOL Date of Exam: 9/25/2024 12:40 PM EDT Indication: right flank pain, history of kidney stone, nausea and vomiting. Comparison: None available. Technique: Axial CT images  were obtained of the abdomen and pelvis without the administration of contrast. Reconstructed coronal and sagittal images were also obtained. Automated exposure control and iterative construction methods were used. Findings: The lung bases are clear. There may be mild emphysematous change. Small fat-containing umbilical hernia. Small amount of gas in the right flank soft tissues may be from recent injection. 1.4 cm probable cyst in the left lobe of the liver. The spleen and pancreas appear normal. There is no adrenal finding. Gallbladder is normal. There are 2 stones in the right renal collecting system the larger measuring up to 0.6 cm in the lower pole of the left collecting system. There are several stones in the left renal collecting system during up to about 0.7 cm in the lower pole  of the left renal collecting system. There is moderate to severe right-sided hydronephrosis and hydroureter with perinephric stranding due to a 0.6 cm stone in the distal right ureter just proximal to the right ureterovesical junction. No bladder stones  are evident. Uterus is normal. No adnexal lesion is seen. The appendix not well visualized. Mild to moderate atherosclerosis. No enlarged adenopathy evident. There is a disc bulge at T12-L1 causing mild canal stenosis. Mild multilevel spine degenerative changes otherwise present.     Impression: There is moderate to severe obstructive uropathy on the right with hydronephrosis, hydroureter, and perinephric stranding due to a 0.6 cm stone in the distal right ureter. Bilateral nephrolithiasis measuring up to 0.6 cm on the right and 0.7 cm on the left. Other findings as above. Electronically Signed: Sergey Harvey MD  9/25/2024 1:09 PM EDT  Workstation ID: [id number]       Differential Diagnosis and Discussion:      Fever: Based on the complaint of fever, differential diagnosis includes but is not limited to meningitis, pneumonia, pyelonephritis, acute uti,  systemic immune response  syndrome, sepsis, viral syndrome, fungal infection, tick born illness and other bacterial infections.  Flank Pain: Differential diagnosis includes but is not limited to kidney stones, pyelonephritis, musculoskeletal disorders, renal infarction, urinary tract infection, hydronephrosis, radiculopathy, aortic aneurysm, renal cell carcinoma.    All labs were reviewed and interpreted by me.    MDM     Amount and/or Complexity of Data Reviewed  Clinical lab tests: reviewed           The patient presents with 2 out of the 4 SIRS criteria and a suspected source for sepsis. In addition, the patient was found to have a surgical emergency, urosepsis requiring immediate surgical intervention. Patient was evaluated and placed on a cardiac monitor for fear of worsening tachycardia and life-threatening hypotension.  Patient was monitored for shock and signs of end-organ damage.  Mental status was repeatedly checked throughout the ED stay.  Medications were ordered by me which includes IV antibiotic.  The case was discussed at length with Dr. Villanueva, urologist who agrees to take the patient to the OR.    Total Critical Care time of 35 minutes. Total critical care time documented does not include time spent on separately billed procedures for services of nurses or physician assistants. I personally saw and examined the patient. I have reviewed all diagnostic interpretations and treatment plans as written. I was present for the key portions of any procedures performed and the inclusive time noted in any critical care statement. Critical care time includes patient management by me, time spent at the patients bedside,  time to review lab and imaging results, discussing patient care, documentation in the medical record, and time spent with family or caregiver.          Patient Care Considerations:          Consultants/Shared Management Plan:    Hospitalist: I have discussed the case with Dr. Oliva who agrees to accept the patient for  admission.  Consultant: I have discussed the case with Dr Villanueva, urologist who agrees to consult on the patient.    Social Determinants of Health:    Patient is independent, reliable, and has access to care.       Disposition and Care Coordination:    Admit:   Through independent evaluation of the patient's history, physical, and imperical data, the patient meets criteria for inpatient admission to the hospital.        Final diagnoses:   Hydronephrosis with urinary obstruction due to ureteral calculus   Sepsis, due to unspecified organism, unspecified whether acute organ dysfunction present        ED Disposition       ED Disposition   Send to OR    Condition   --    Comment   --               This medical record created using voice recognition software.             Elaine Woodward PA-C  09/25/24 3460       Elaine Woodward PA-C  09/25/24 1914

## 2024-09-25 NOTE — H&P
[facility] HISTORY AND PHYSICAL  Date: 2024   Patient Name: Madelaine Nation  : 1975  MRN: [id number]  Primary Care Physician:  Makayla Bragg APRN  Date of admission: 2024    Subjective   Subjective     Chief Complaint: Flank pain    HPI:    Madelaine Nation is a 48 y.o. female past medical history of kidney stones who presents with flank pain    Patient has a history of kidney stones.  She saw her primary care doctor today.  Due to her right flank pain there was concern for potential kidney stone.  She has had fever.  Her symptoms started on Monday.  He states that her fever broke but then she developed a fever again this evening.  She was given 1 dose of ceftriaxone at her primary care doctor's office today on     In the emergency department the patient's vital signs are as follows: Temperature is 101, pulse 122, blood pressure 104/61, 97% on room air.  CBC shows a white count of 21,000 with 85% neutrophils.  CMP shows a creatinine of 1.03.  She has a lactate of 2.5.  Urinalysis shows 1+ bacteria large blood positive nitrite and large leuk esterase.  Blood cultures were sent.  Urine culture was sent.  Patient was given a dose of ceftriaxone.  Urology was consulted and the patient will get a stent placed this evening.  She will admitted to the internal medicine service for sepsis.    All systems reviewed abnormalities noted above      Personal History     Past Medical History:  Kidney stone  Anxiety/depression    Past Surgical History:  Appendectomy  Tonsillectomy   tubal abdominal ligation    Family History:   Anxiety    Social History:   Pack per day  No alcohol    Home Medications:  cephalexin, ibuprofen, and ondansetron ODT    Allergies:  Allergies   Allergen Reactions    Bactrim [Sulfamethoxazole-Trimethoprim] Hives and Rash    Penicillins Rash           Objective   Objective     Vitals:   Temp:  [99.8 °F (37.7 °C)-101.3 °F (38.5 °C)] 101 °F (38.3 °C)  Heart Rate:   [] 108  Resp:  [20] 20  BP: (104-108)/(61-70) 104/61    Physical Exam    Constitutional: Awake, alert, no acute distress.  Febrile   Eyes: Pupils equal, sclerae anicteric, no conjunctival injection   HENT: NCAT, mucous membranes moist   Neck: Supple, no thyromegaly, no lymphadenopathy, trachea midline   Respiratory: Clear to auscultation bilaterally, nonlabored respirations    Cardiovascular: Tachycardia, no murmurs, rubs, or gallops, palpable pedal pulses bilaterally   Gastrointestinal: Positive bowel sounds, soft, nontender, nondistended   Musculoskeletal: No bilateral ankle edema, no clubbing or cyanosis to extremities.  Right sided flank pain   Psychiatric: Appropriate affect, cooperative   Neurologic: Oriented x 3, strength symmetric in all extremities, Cranial Nerves grossly intact to confrontation, speech clear   Skin: No rashes     Result Review    Result Review:  I have personally reviewed the results from the time of this admission to 9/25/2024 19:51 EDT and agree with these findings:  Imaging and labs reviewed r      Assessment & Plan   Assessment / Plan     Assessment/Plan:   Sepsis due to obstructive uropathy (fever, leukocytosis, tachycardia)  Tobacco abuse    Plan:  -- admit to hospital service  --Consult urology  --Will give ceftriaxone 2 g as I am concern for bacteremia  -- Follow-up blood culture  -- Follow-up urine culture  -- Patient was given 1 2 L of normal saline in the emergency department  -- I will continue on her 100 mL of normal saline overnight          VTE Prophylaxis:  Lovenox        CODE STATUS:    Level Of Support Discussed With: Patient  Code Status (Patient has no pulse and is not breathing): CPR (Attempt to Resuscitate)  Medical Interventions (Patient has pulse or is breathing): Full Support      Admission Status:  I believe this patient meets admission status.    Electronically signed by Gustavo Oliva MD, 09/25/24, 7:51 PM EDT.

## 2024-09-25 NOTE — CONSULTS
[facility]   Consult Note    Patient Name: Madelaine Nation  : 1975  MRN: [id number]  Primary Care Physician:  Makayla Bragg APRN  Referring Physician: LEE Dle Valle  Date of admission: 2024    Consults  Subjective   Subjective     Reason for Consult/ Chief Complaint: Right flank pain    History of Present Illness  Madelaine Nation is a 48 y.o. female patient comes in with a 1 day history of severe right flank pain with nausea and vomiting and fever.  She has had a history of kidney stones has had stents placed and has had infections before she states.  Here CT scan shows a 6 mm stone in the right distal ureter.  Review of Systems   Other than what is mentioned in HPI the review of systems is negative.  Personal History     Past Medical History:   Diagnosis Date    Anxiety 2018    Kidney stone        Past Surgical History:   Procedure Laterality Date    APPENDECTOMY      TONSILLECTOMY      TUBAL ABDOMINAL LIGATION         Family History: family history includes Anxiety disorder in her mother; Arthritis in her mother; COPD in her father. Otherwise pertinent FHx was reviewed and not pertinent to current issue.    Social History:  reports that she has been smoking cigarettes. She has a 10 pack-year smoking history. She has never been exposed to tobacco smoke. She has never used smokeless tobacco. She reports that she does not currently use alcohol. She reports that she does not use drugs.    Home Medications:   cephalexin, ibuprofen, and ondansetron ODT    Allergies:  Allergies   Allergen Reactions    Bactrim [Sulfamethoxazole-Trimethoprim] Hives and Rash    Penicillins Rash       Objective    Objective     Vitals:  Temp:  [99.8 °F (37.7 °C)-101.3 °F (38.5 °C)] 99.8 °F (37.7 °C)  Heart Rate:  [] 92  Resp:  [20] 20  BP: (104-108)/(61-70) 104/61    Physical Exam  Awake alert oriented  Temperature is 101.3 pulse is 122.  Blood pressure 108/70  Result Review    Result Review:  I have  personally reviewed the results from the time of this admission to 9/25/2024 19:02 EDT and agree with these findings:  [x]  Laboratory list / accordion  [x]  Microbiology  [x]  Radiology  []  EKG/Telemetry   []  Cardiology/Vascular   []  Pathology  []  Old records  []  Other:  Most notable findings include: Other than what elevated white blood cell count of 21.86 mm stone in the right distal ureter urinalysis too numerous to count white blood cells.      Assessment & Plan   Assessment / Plan     Brief Patient Summary:  Madelaine Nation is a 48 y.o. female who patient has a 6 mm stone obstructing the right ureter with infection and acute right pyelonephritis.    Active Hospital Problems:  There are no active hospital problems to display for this patient.    Plan:   I discussed the need to place a stent and the patient tonight.  I told her that she has an infection and is septic from it and we need to place a stent in the right ureter.  I then discussed with patient and family all the risks benefits alternatives all potential complications of watchful waiting versus proceeding with cystoscopy right ureteral stent placement.  Patient and family understand our discussion they voiced her understanding of our discussion and wished to proceed with the procedure.    Pietro Villanueva MD

## 2024-09-26 LAB
ALBUMIN SERPL-MCNC: 3 G/DL (ref 3.5–5.2)
ALBUMIN/GLOB SERPL: 1.4 G/DL
ALP SERPL-CCNC: 64 U/L (ref 39–117)
ALT SERPL W P-5'-P-CCNC: 49 U/L (ref 1–33)
ANION GAP SERPL CALCULATED.3IONS-SCNC: 8.2 MMOL/L (ref 5–15)
AST SERPL-CCNC: 73 U/L (ref 1–32)
BILIRUB SERPL-MCNC: 0.6 MG/DL (ref 0–1.2)
BUN SERPL-MCNC: 18 MG/DL (ref 6–20)
BUN/CREAT SERPL: 19.1 (ref 7–25)
CALCIUM SPEC-SCNC: 8.3 MG/DL (ref 8.6–10.5)
CHLORIDE SERPL-SCNC: 106 MMOL/L (ref 98–107)
CO2 SERPL-SCNC: 22.8 MMOL/L (ref 22–29)
CREAT SERPL-MCNC: 0.94 MG/DL (ref 0.57–1)
DEPRECATED RDW RBC AUTO: 45.1 FL (ref 37–54)
EGFRCR SERPLBLD CKD-EPI 2021: 75.9 ML/MIN/1.73
ERYTHROCYTE [DISTWIDTH] IN BLOOD BY AUTOMATED COUNT: 13.2 % (ref 12.3–15.4)
GLOBULIN UR ELPH-MCNC: 2.2 GM/DL
GLUCOSE SERPL-MCNC: 152 MG/DL (ref 65–99)
HCT VFR BLD AUTO: 33.6 % (ref 34–46.6)
HGB BLD-MCNC: 11.3 G/DL (ref 12–15.9)
LARGE PLATELETS: ABNORMAL
LYMPHOCYTES # BLD MANUAL: 0.52 10*3/MM3 (ref 0.7–3.1)
LYMPHOCYTES NFR BLD MANUAL: 2 % (ref 5–12)
MAGNESIUM SERPL-MCNC: 1.5 MG/DL (ref 1.6–2.6)
MCH RBC QN AUTO: 31.1 PG (ref 26.6–33)
MCHC RBC AUTO-ENTMCNC: 33.6 G/DL (ref 31.5–35.7)
MCV RBC AUTO: 92.6 FL (ref 79–97)
MONOCYTES # BLD: 0.35 10*3/MM3 (ref 0.1–0.9)
NEUTROPHILS # BLD AUTO: 16.52 10*3/MM3 (ref 1.7–7)
NEUTROPHILS NFR BLD MANUAL: 82 % (ref 42.7–76)
NEUTS BAND NFR BLD MANUAL: 13 % (ref 0–5)
PLATELET # BLD AUTO: 88 10*3/MM3 (ref 140–450)
PMV BLD AUTO: 12.3 FL (ref 6–12)
POTASSIUM SERPL-SCNC: 4.3 MMOL/L (ref 3.5–5.2)
PROCALCITONIN SERPL-MCNC: 5.02 NG/ML (ref 0–0.25)
PROT SERPL-MCNC: 5.2 G/DL (ref 6–8.5)
RBC # BLD AUTO: 3.63 10*6/MM3 (ref 3.77–5.28)
RBC MORPH BLD: NORMAL
SMALL PLATELETS BLD QL SMEAR: ABNORMAL
SMUDGE CELLS BLD QL SMEAR: ABNORMAL
SODIUM SERPL-SCNC: 137 MMOL/L (ref 136–145)
VARIANT LYMPHS NFR BLD MANUAL: 3 % (ref 19.6–45.3)
WBC NRBC COR # BLD AUTO: 17.39 10*3/MM3 (ref 3.4–10.8)

## 2024-09-26 PROCEDURE — 85007 BL SMEAR W/DIFF WBC COUNT: CPT | Performed by: UROLOGY

## 2024-09-26 PROCEDURE — 85025 COMPLETE CBC W/AUTO DIFF WBC: CPT | Performed by: UROLOGY

## 2024-09-26 PROCEDURE — 25810000003 SODIUM CHLORIDE 0.9 % SOLUTION: Performed by: UROLOGY

## 2024-09-26 PROCEDURE — 99232 SBSQ HOSP IP/OBS MODERATE 35: CPT | Performed by: STUDENT IN AN ORGANIZED HEALTH CARE EDUCATION/TRAINING PROGRAM

## 2024-09-26 PROCEDURE — 84145 PROCALCITONIN (PCT): CPT | Performed by: UROLOGY

## 2024-09-26 PROCEDURE — 83735 ASSAY OF MAGNESIUM: CPT | Performed by: UROLOGY

## 2024-09-26 PROCEDURE — 80053 COMPREHEN METABOLIC PANEL: CPT | Performed by: UROLOGY

## 2024-09-26 PROCEDURE — 94799 UNLISTED PULMONARY SVC/PX: CPT

## 2024-09-26 PROCEDURE — 25010000002 ENOXAPARIN PER 10 MG: Performed by: UROLOGY

## 2024-09-26 PROCEDURE — 25010000002 CEFTRIAXONE PER 250 MG: Performed by: UROLOGY

## 2024-09-26 PROCEDURE — 25010000002 MAGNESIUM SULFATE 2 GM/50ML SOLUTION: Performed by: STUDENT IN AN ORGANIZED HEALTH CARE EDUCATION/TRAINING PROGRAM

## 2024-09-26 PROCEDURE — 94761 N-INVAS EAR/PLS OXIMETRY MLT: CPT

## 2024-09-26 RX ORDER — MAGNESIUM SULFATE HEPTAHYDRATE 40 MG/ML
2 INJECTION, SOLUTION INTRAVENOUS ONCE
Status: COMPLETED | OUTPATIENT
Start: 2024-09-26 | End: 2024-09-26

## 2024-09-26 RX ORDER — ACETAMINOPHEN 325 MG/1
650 TABLET ORAL EVERY 6 HOURS PRN
Status: DISCONTINUED | OUTPATIENT
Start: 2024-09-26 | End: 2024-09-26

## 2024-09-26 RX ORDER — ACETAMINOPHEN 325 MG/1
650 TABLET ORAL EVERY 6 HOURS PRN
Status: DISCONTINUED | OUTPATIENT
Start: 2024-09-26 | End: 2024-09-28 | Stop reason: HOSPADM

## 2024-09-26 RX ORDER — ALUMINA, MAGNESIA, AND SIMETHICONE 2400; 2400; 240 MG/30ML; MG/30ML; MG/30ML
15 SUSPENSION ORAL EVERY 6 HOURS PRN
Status: DISCONTINUED | OUTPATIENT
Start: 2024-09-26 | End: 2024-09-28 | Stop reason: HOSPADM

## 2024-09-26 RX ORDER — KETOROLAC TROMETHAMINE 30 MG/ML
15 INJECTION, SOLUTION INTRAMUSCULAR; INTRAVENOUS EVERY 6 HOURS PRN
Status: COMPLETED | OUTPATIENT
Start: 2024-09-26 | End: 2024-09-27

## 2024-09-26 RX ADMIN — ACETAMINOPHEN 650 MG: 325 TABLET ORAL at 10:51

## 2024-09-26 RX ADMIN — SODIUM CHLORIDE 100 ML/HR: 9 INJECTION, SOLUTION INTRAVENOUS at 07:42

## 2024-09-26 RX ADMIN — ENOXAPARIN SODIUM 30 MG: 100 INJECTION SUBCUTANEOUS at 08:26

## 2024-09-26 RX ADMIN — MAGNESIUM SULFATE HEPTAHYDRATE 2 G: 40 INJECTION, SOLUTION INTRAVENOUS at 09:14

## 2024-09-26 RX ADMIN — ACETAMINOPHEN 650 MG: 325 TABLET ORAL at 16:43

## 2024-09-26 RX ADMIN — ALUMINUM HYDROXIDE, MAGNESIUM HYDROXIDE, AND DIMETHICONE 15 ML: 400; 400; 40 SUSPENSION ORAL at 20:49

## 2024-09-26 RX ADMIN — Medication 10 ML: at 20:49

## 2024-09-26 RX ADMIN — CEFTRIAXONE SODIUM 2000 MG: 2 INJECTION, POWDER, FOR SOLUTION INTRAMUSCULAR; INTRAVENOUS at 08:26

## 2024-09-26 NOTE — OP NOTE
CYSTOSCOPY URETERAL CATHETER/STENT INSERTION  Procedure Report    Patient Name:  Madelaine Nation  YOB: 1975    Date of Surgery:  9/25/2024     Indications: Right distal ureteral stone with acute right pyelonephritis    Pre-op Diagnosis:   Kidney stone [N20.0]       Post-Op Diagnosis Codes:     * Kidney stone [N20.0]    Procedure/CPT® Codes:      Procedure(s):  CYSTOSCOPY URETERAL CATHETER/STENT INSERTION right retrograde pyelogram placement of 6 Amharic by 26 cm stent right side without a string.        Staff:  Surgeon(s):  Pietro Villanueva MD         Anesthesia: Monitored Anesthesia Care    Estimated Blood Loss: none    Implants:    Implant Name Type Inv. Item Serial No.  Lot No. LRB No. Used Action   STNT URETRL ASCERTA 6F 26CM - [id number] Stent STNT URETRL ASCERTA 6F 26CM  [employer] [id number] Right 1 Implanted       Specimen:          None        Findings: Right ureteral stone right hydronephrosis    Complications: None    Description of Procedure: Indication for procedure: Patient came to the emergency room with a fever of over 101 and tachycardia.  CT scan was performed and it showed a 6 mm obstructing stone right distal ureter.  Urinalysis shows infection too numerous to count white blood cells.  Will plan to proceed with cystoscopy right ureteral stent placement and once the infection clears after a week hopefully we can proceed with right ureteroscopy holmium laser stent placement.  Description of procedure after informed consent was obtained patient was taken to the operating room.  Intravenous sedation was performed.  Patient was placed in a dorsal lithotomy position.  Groin is prepped and draped in a sterile fashion.  Cystoscopy is performed and the bladder appears to be normal with some injection of the mucosa noted.  We located the right ureteral orifice past the ureteral catheter and there injected contrast and saw significant hydroureteronephrosis on the  right side.  Sensor tip guidewire was passed through the catheter into the right kidney catheter was removed and a 6 Frisian by 26 cm stent was passed over the wire of the right kidney.  Fluoroscopy showed the proximal end to be in good position cystoscopy showed the distal end to be in good position in the bladder.  The bladder was drained the cystoscope was removed.  Patient tolerated the procedure well there were no complications she was extubated stable awake alert in good condition 1 transported to recovery room.                  Pietro Villanueva MD     Date: 9/25/2024  Time: 20:26 EDT

## 2024-09-26 NOTE — PROGRESS NOTES
[facility]     Progress Note    Patient Name: Madelaine Nation  : 1975  MRN: [id number]  Primary Care Physician:  Makayla Bragg APRN  Date of admission: 2024    Subjective   Subjective     Chief Complaint: Patient is doing well has no complaints.    Flank Pain    Vomiting       Patient Reports patient came into the hospital yesterday with right flank pain it was noted that she had a stone in the right ureter and patient was septic with fever and elevated white count of 21.81.  She was taken urgently to the operating room where a right ureteral stent was placed.  She is still running a fever this morning.    Review of Systems   Gastrointestinal:  Positive for vomiting.   Genitourinary:  Positive for flank pain.       Objective   Objective     Vitals:   Temp:  [97.7 °F (36.5 °C)-101.6 °F (38.7 °C)] 100.6 °F (38.1 °C)  Heart Rate:  [] 72  Resp:  [14-20] 20  BP: ()/(56-70) 104/66    Physical Exam   Subjective fever this morning vital signs are stable  Result Review    Result Review:  I have personally reviewed the results from the time of this admission to 2024 10:28 EDT and agree with these findings:  [x]  Laboratory list / accordion  []  Microbiology  []  Radiology  []  EKG/Telemetry   []  Cardiology/Vascular   []  Pathology  []  Old records  []  Other:  Most notable findings include: White blood cell count is 17.39 down from 21.81 creatinine is 0.94      Assessment & Plan   Assessment / Plan     Brief Patient Summary:  Madelaine Nation is a 48 y.o. female who kidney stones with sepsis    Active Hospital Problems:  Active Hospital Problems    Diagnosis     **Kidney stone     Sepsis      Plan:   Patient underwent cystoscopy right ureteral stent placement yesterday she continues to have fever her white count remains elevated.  Recommend continuing with IV antibiotics and when patient is no longer septic discharge home with oral antibiotics and she can follow-up with outpatient  urology clinic to be scheduled for definitive treatment of the right distal ureteral stone.    VTE Prophylaxis:  Pharmacologic VTE prophylaxis orders are present.        CODE STATUS:    Level Of Support Discussed With: Patient  Code Status (Patient has no pulse and is not breathing): CPR (Attempt to Resuscitate)  Medical Interventions (Patient has pulse or is breathing): Full Support    Disposition:  I expect patient to be discharged home.    Pietro Villanueva MD

## 2024-09-26 NOTE — PLAN OF CARE
Goal Outcome Evaluation:         Elevated temperature. PRN tylenol given. MD aware. Activity encouraged. No complaints of pain or nausea. Family at bedside. Call light and table within reach. No concerns per patient at this time.

## 2024-09-26 NOTE — PLAN OF CARE
Goal Outcome Evaluation:  Plan of Care Reviewed With: patient, spouse           Outcome Evaluation: Patient admitted to unit from PACU this shift. Patient A&OX4. Family at bedside. Patient ambulated from stretcher to bed and has ambulated to bathroom, patient tolerating well. Patient denies pain or discomfort throughout shift. Patient states she has urinated but did not save urine for this RN to see, patient states urine was bloody but no clots noted per patient. Patient has been drinking and ate a sandwich and tolerated well. Patient denies N/V throughout shift. VSS. Patient has rested quietly throughout shift.

## 2024-09-26 NOTE — PROGRESS NOTES
[facility]   Hospitalist Progress Note  Date: 2024  Patient Name: Madelaine Nation  : 1975  MRN: [id number]  Date of admission: 2024  Room/Bed: [facility]      Subjective   Subjective     Chief Complaint: Flank pain    Summary:Madelaine Nation is a 48 y.o. female  past medical history of kidney stones who presents with flank pain     Patient has a history of kidney stones.  She saw her primary care doctor today.  Due to her right flank pain there was concern for potential kidney stone.  She has had fever.  Her symptoms started on Monday.  He states that her fever broke but then she developed a fever again this evening.  She was given 1 dose of ceftriaxone at her primary care doctor's office today on      In the emergency department the patient's vital signs are as follows: Temperature is 101, pulse 122, blood pressure 104/61, 97% on room air.  CBC shows a white count of 21,000 with 85% neutrophils.  CMP shows a creatinine of 1.03.  She has a lactate of 2.5.  Urinalysis shows 1+ bacteria large blood positive nitrite and large leuk esterase.  Blood cultures were sent.  Urine culture was sent.  Patient was given a dose of ceftriaxone.  Urology was consulted and the patient will get a stent placed this evening.  She will admitted to the internal medicine service for sepsis.    Interval Followup: No acute overnight events.  No acute distress.  Patient resting comfortably in bed.  Reports that she is feeling a little bit better.  Discussed waiting for cultures and continue antibiotics.  Answered all questions.    Review of Systems    All systems reviewed and negative except for what is outlined above.      Objective   Objective     Vitals:   Temp:  [97.7 °F (36.5 °C)-101.6 °F (38.7 °C)] 100.2 °F (37.9 °C)  Heart Rate:  [] 89  Resp:  [14-20] 20  BP: ()/(56-68) 97/59    Physical Exam   Gen: NAD, Alert and Oriented  Cards: RRR, no murmur   Pulm: CTA b/l, no wheezing  Abd: soft,  nondistended  Extremities: no pitting edema    Result Review    Result Review:  I have personally reviewed these results:  [x]  Laboratory      Lab 09/26/24  0409 09/25/24  2139 09/25/24  1631 09/25/24  1548   WBC 17.39*  --   --  21.81*   HEMOGLOBIN 11.3*  --   --  13.7   HEMATOCRIT 33.6*  --   --  39.7   PLATELETS 88*  --   --  117*   NEUTROS ABS 16.52*  --   --  18.65*   IMMATURE GRANS (ABS)  --   --   --  0.44*   LYMPHS ABS  --   --   --  1.19   MONOS ABS  --   --   --  1.28*   EOS ABS  --   --   --  0.18   MCV 92.6  --   --  90.4   PROCALCITONIN 5.02*  --   --   --    LACTATE  --  1.6 2.5*  --          Lab 09/26/24  0409 09/25/24  1548   SODIUM 137 135*   POTASSIUM 4.3 3.5   CHLORIDE 106 98   CO2 22.8 22.6   ANION GAP 8.2 14.4   BUN 18 17   CREATININE 0.94 1.03*   EGFR 75.9 68.1   GLUCOSE 152* 182*   CALCIUM 8.3* 9.5   MAGNESIUM 1.5*  --          Lab 09/26/24  0409 09/25/24  1548   TOTAL PROTEIN 5.2* 7.2   ALBUMIN 3.0* 4.3   GLOBULIN 2.2 2.9   ALT (SGPT) 49* 21   AST (SGOT) 73* 36*   BILIRUBIN 0.6 0.5   ALK PHOS 64 68   LIPASE  --  17                     Brief Urine Lab Results  (Last result in the past 365 days)        Color   Clarity   Blood   Leuk Est   Nitrite   Protein   CREAT   Urine HCG        09/25/24 1720               Negative       09/25/24 1720 Yellow   Turbid   Large (3+)   Large (3+)   Positive   100 mg/dL (2+)                 [x]  Microbiology   Microbiology Results (last 10 days)       Procedure Component Value - Date/Time    Urine Culture - Urine, Urine, Clean Catch [[id number]]  (Normal) Collected: 09/25/24 1310    Lab Status: Preliminary result Specimen: Urine, Clean Catch Updated: 09/26/24 1014     Urine Culture Culture in progress          [x]  Radiology  CT Abdomen Pelvis Stone Protocol    Result Date: 9/25/2024  Impression: There is moderate to severe obstructive uropathy on the right with hydronephrosis, hydroureter, and perinephric stranding due to a 0.6 cm stone in the distal right  ureter. Bilateral nephrolithiasis measuring up to 0.6 cm on the right and 0.7 cm on the left. Other findings as above. Electronically Signed: Sergey Harvey MD  9/25/2024 1:09 PM EDT  Workstation ID: [id number]   []  EKG/Telemetry   []  Cardiology/Vascular   []  Pathology  []  Old records  []  Other:    Assessment & Plan   Assessment / Plan     Assessment:  Sepsis secondary to obstructive uropathy  Pyelonephritis  Tobacco abuse  Right hydronephrosis, hydroureter, and perinephric stranding  Hypomagnesemia  Transaminitis  Leukocytosis  Thrombocytopenia    Plan:  Continue inpatient admission  Urology consulted.  S/p cystoscopy and stent placement  Continue IV Rocephin  Magnesium 1.5.  2 g IV replacement ordered  LFTs up a little bit, likely congestive   WBC improving  Thrombocytopenia with platelets 88, likely due to infection  Patient declines nicotine patch  Blood cultures pending  Urine culture pending  A.m. labs       Discussed with RN.    VTE Prophylaxis:  Pharmacologic VTE prophylaxis orders are present.        CODE STATUS:   Level Of Support Discussed With: Patient  Code Status (Patient has no pulse and is not breathing): CPR (Attempt to Resuscitate)  Medical Interventions (Patient has pulse or is breathing): Full Support      Electronically signed by Lakeisha Rueda DO, 9/26/2024, 13:35 EDT.

## 2024-09-27 LAB
ANION GAP SERPL CALCULATED.3IONS-SCNC: 8.1 MMOL/L (ref 5–15)
ANISOCYTOSIS BLD QL: ABNORMAL
BUN SERPL-MCNC: 15 MG/DL (ref 6–20)
BUN/CREAT SERPL: 22.4 (ref 7–25)
CALCIUM SPEC-SCNC: 8.3 MG/DL (ref 8.6–10.5)
CHLORIDE SERPL-SCNC: 105 MMOL/L (ref 98–107)
CO2 SERPL-SCNC: 24.9 MMOL/L (ref 22–29)
CREAT SERPL-MCNC: 0.67 MG/DL (ref 0.57–1)
DEPRECATED RDW RBC AUTO: 45.8 FL (ref 37–54)
EGFRCR SERPLBLD CKD-EPI 2021: 109.3 ML/MIN/1.73
ERYTHROCYTE [DISTWIDTH] IN BLOOD BY AUTOMATED COUNT: 13.4 % (ref 12.3–15.4)
GLUCOSE SERPL-MCNC: 107 MG/DL (ref 65–99)
HCT VFR BLD AUTO: 31.8 % (ref 34–46.6)
HGB BLD-MCNC: 10.7 G/DL (ref 12–15.9)
LYMPHOCYTES # BLD MANUAL: 0.59 10*3/MM3 (ref 0.7–3.1)
LYMPHOCYTES NFR BLD MANUAL: 2 % (ref 5–12)
MCH RBC QN AUTO: 30.9 PG (ref 26.6–33)
MCHC RBC AUTO-ENTMCNC: 33.6 G/DL (ref 31.5–35.7)
MCV RBC AUTO: 91.9 FL (ref 79–97)
MONOCYTES # BLD: 0.29 10*3/MM3 (ref 0.1–0.9)
NEUTROPHILS # BLD AUTO: 13.8 10*3/MM3 (ref 1.7–7)
NEUTROPHILS NFR BLD MANUAL: 81 % (ref 42.7–76)
NEUTS BAND NFR BLD MANUAL: 13 % (ref 0–5)
PLATELET # BLD AUTO: 81 10*3/MM3 (ref 140–450)
PMV BLD AUTO: 12.6 FL (ref 6–12)
POTASSIUM SERPL-SCNC: 4.3 MMOL/L (ref 3.5–5.2)
RBC # BLD AUTO: 3.46 10*6/MM3 (ref 3.77–5.28)
SMALL PLATELETS BLD QL SMEAR: ABNORMAL
SODIUM SERPL-SCNC: 138 MMOL/L (ref 136–145)
VARIANT LYMPHS NFR BLD MANUAL: 4 % (ref 19.6–45.3)
WBC MORPH BLD: NORMAL
WBC NRBC COR # BLD AUTO: 14.68 10*3/MM3 (ref 3.4–10.8)

## 2024-09-27 PROCEDURE — 25010000002 CEFTRIAXONE PER 250 MG: Performed by: UROLOGY

## 2024-09-27 PROCEDURE — 94799 UNLISTED PULMONARY SVC/PX: CPT

## 2024-09-27 PROCEDURE — 99232 SBSQ HOSP IP/OBS MODERATE 35: CPT | Performed by: STUDENT IN AN ORGANIZED HEALTH CARE EDUCATION/TRAINING PROGRAM

## 2024-09-27 PROCEDURE — 25010000002 ONDANSETRON PER 1 MG: Performed by: STUDENT IN AN ORGANIZED HEALTH CARE EDUCATION/TRAINING PROGRAM

## 2024-09-27 PROCEDURE — 25010000002 KETOROLAC TROMETHAMINE PER 15 MG: Performed by: PHYSICIAN ASSISTANT

## 2024-09-27 PROCEDURE — 80048 BASIC METABOLIC PNL TOTAL CA: CPT | Performed by: STUDENT IN AN ORGANIZED HEALTH CARE EDUCATION/TRAINING PROGRAM

## 2024-09-27 PROCEDURE — 85025 COMPLETE CBC W/AUTO DIFF WBC: CPT | Performed by: STUDENT IN AN ORGANIZED HEALTH CARE EDUCATION/TRAINING PROGRAM

## 2024-09-27 PROCEDURE — 85007 BL SMEAR W/DIFF WBC COUNT: CPT | Performed by: STUDENT IN AN ORGANIZED HEALTH CARE EDUCATION/TRAINING PROGRAM

## 2024-09-27 PROCEDURE — 25010000002 ENOXAPARIN PER 10 MG: Performed by: UROLOGY

## 2024-09-27 RX ORDER — ONDANSETRON 2 MG/ML
4 INJECTION INTRAMUSCULAR; INTRAVENOUS EVERY 6 HOURS PRN
Status: DISCONTINUED | OUTPATIENT
Start: 2024-09-27 | End: 2024-09-28 | Stop reason: HOSPADM

## 2024-09-27 RX ADMIN — Medication 10 ML: at 08:01

## 2024-09-27 RX ADMIN — KETOROLAC TROMETHAMINE 15 MG: 30 INJECTION, SOLUTION INTRAMUSCULAR; INTRAVENOUS at 11:03

## 2024-09-27 RX ADMIN — ALUMINUM HYDROXIDE, MAGNESIUM HYDROXIDE, AND DIMETHICONE 15 ML: 400; 400; 40 SUSPENSION ORAL at 19:31

## 2024-09-27 RX ADMIN — CEFTRIAXONE SODIUM 2000 MG: 2 INJECTION, POWDER, FOR SOLUTION INTRAMUSCULAR; INTRAVENOUS at 08:01

## 2024-09-27 RX ADMIN — Medication 10 ML: at 22:21

## 2024-09-27 RX ADMIN — ONDANSETRON 4 MG: 2 INJECTION INTRAMUSCULAR; INTRAVENOUS at 11:34

## 2024-09-27 RX ADMIN — KETOROLAC TROMETHAMINE 15 MG: 30 INJECTION, SOLUTION INTRAMUSCULAR; INTRAVENOUS at 22:21

## 2024-09-27 RX ADMIN — ONDANSETRON 4 MG: 2 INJECTION INTRAMUSCULAR; INTRAVENOUS at 22:21

## 2024-09-27 RX ADMIN — ALUMINUM HYDROXIDE, MAGNESIUM HYDROXIDE, AND DIMETHICONE 15 ML: 400; 400; 40 SUSPENSION ORAL at 12:45

## 2024-09-27 RX ADMIN — ENOXAPARIN SODIUM 30 MG: 100 INJECTION SUBCUTANEOUS at 08:00

## 2024-09-27 NOTE — PLAN OF CARE
Goal Outcome Evaluation:  Plan of Care Reviewed With: patient, spouse        Progress: improving  Outcome Evaluation: Pt denies pain this shift, c/o heartburn PRN meds given per order with relief. Able to make needs known. Continues to progress towards d/c.  Gayle Livingston RN

## 2024-09-27 NOTE — PROGRESS NOTES
[facility]   Hospitalist Progress Note  Date: 2024  Patient Name: Madelaine Nation  : 1975  MRN: [id number]  Date of admission: 2024  Room/Bed: [facility]      Subjective   Subjective     Chief Complaint: Flank pain    Summary:Madelaine Nation is a 48 y.o. female  past medical history of kidney stones who presents with flank pain     Patient has a history of kidney stones.  She saw her primary care doctor today.  Due to her right flank pain there was concern for potential kidney stone.  She has had fever.  Her symptoms started on Monday.  He states that her fever broke but then she developed a fever again this evening.  She was given 1 dose of ceftriaxone at her primary care doctor's office today on      In the emergency department the patient's vital signs are as follows: Temperature is 101, pulse 122, blood pressure 104/61, 97% on room air.  CBC shows a white count of 21,000 with 85% neutrophils.  CMP shows a creatinine of 1.03.  She has a lactate of 2.5.  Urinalysis shows 1+ bacteria large blood positive nitrite and large leuk esterase.  Blood cultures were sent.  Urine culture was sent.  Patient was given a dose of ceftriaxone.  Urology was consulted and the patient will get a stent placed this evening.  She will admitted to the internal medicine service for sepsis.    Interval Followup: No acute overnight events.  No acute distress.  Afebrile overnight.  Nausea this morning requiring Zofran.  She reports she feels better since getting medication.  Discussed care plan and needing her culture to come back before DC.    Review of Systems    All systems reviewed and negative except for what is outlined above.      Objective   Objective     Vitals:   Temp:  [98.4 °F (36.9 °C)-100.2 °F (37.9 °C)] 99.7 °F (37.6 °C)  Heart Rate:  [65-78] 65  Resp:  [16-20] 16  BP: ()/(54-73) 114/73    Physical Exam   Gen: NAD, Alert and Oriented  Cards: RRR, no murmur   Pulm: CTA b/l, no wheezing  Abd: soft,  nondistended  Extremities: no pitting edema    Result Review    Result Review:  I have personally reviewed these results:  [x]  Laboratory      Lab 09/27/24 0411 09/26/24 0409 09/25/24  2139 09/25/24  1631 09/25/24  1548   WBC 14.68* 17.39*  --   --  21.81*   HEMOGLOBIN 10.7* 11.3*  --   --  13.7   HEMATOCRIT 31.8* 33.6*  --   --  39.7   PLATELETS 81* 88*  --   --  117*   NEUTROS ABS 13.80* 16.52*  --   --  18.65*   IMMATURE GRANS (ABS)  --   --   --   --  0.44*   LYMPHS ABS  --   --   --   --  1.19   MONOS ABS  --   --   --   --  1.28*   EOS ABS  --   --   --   --  0.18   MCV 91.9 92.6  --   --  90.4   PROCALCITONIN  --  5.02*  --   --   --    LACTATE  --   --  1.6 2.5*  --          Lab 09/27/24  0411 09/26/24  0409 09/25/24  1548   SODIUM 138 137 135*   POTASSIUM 4.3 4.3 3.5   CHLORIDE 105 106 98   CO2 24.9 22.8 22.6   ANION GAP 8.1 8.2 14.4   BUN 15 18 17   CREATININE 0.67 0.94 1.03*   EGFR 109.3 75.9 68.1   GLUCOSE 107* 152* 182*   CALCIUM 8.3* 8.3* 9.5   MAGNESIUM  --  1.5*  --          Lab 09/26/24  0409 09/25/24  1548   TOTAL PROTEIN 5.2* 7.2   ALBUMIN 3.0* 4.3   GLOBULIN 2.2 2.9   ALT (SGPT) 49* 21   AST (SGOT) 73* 36*   BILIRUBIN 0.6 0.5   ALK PHOS 64 68   LIPASE  --  17                     Brief Urine Lab Results  (Last result in the past 365 days)        Color   Clarity   Blood   Leuk Est   Nitrite   Protein   CREAT   Urine HCG        09/25/24 1720               Negative       09/25/24 1720 Yellow   Turbid   Large (3+)   Large (3+)   Positive   100 mg/dL (2+)                 [x]  Microbiology   Microbiology Results (last 10 days)       Procedure Component Value - Date/Time    Blood Culture - Blood, Arm, Left [[id number]]  (Normal) Collected: 09/25/24 1631    Lab Status: Preliminary result Specimen: Blood from Arm, Left Updated: 09/26/24 1645     Blood Culture No growth at 24 hours    Blood Culture - Blood, Arm, Left [[id number]]  (Normal) Collected: 09/25/24 1631    Lab Status: Preliminary result  Specimen: Blood from Arm, Left Updated: 09/26/24 1645     Blood Culture No growth at 24 hours    Urine Culture - Urine, Urine, Clean Catch [[id number]]  (Abnormal) Collected: 09/25/24 1310    Lab Status: Preliminary result Specimen: Urine, Clean Catch Updated: 09/27/24 1139     Urine Culture >100,000 CFU/mL Gram Negative Bacilli    Narrative:      Colonization of the urinary tract without infection is common. Treatment is discouraged unless the patient is symptomatic, pregnant, or undergoing an invasive urologic procedure.          [x]  Radiology  CT Abdomen Pelvis Stone Protocol    Result Date: 9/25/2024  Impression: There is moderate to severe obstructive uropathy on the right with hydronephrosis, hydroureter, and perinephric stranding due to a 0.6 cm stone in the distal right ureter. Bilateral nephrolithiasis measuring up to 0.6 cm on the right and 0.7 cm on the left. Other findings as above. Electronically Signed: Sergey Harvey MD  9/25/2024 1:09 PM EDT  Workstation ID: [id number]   []  EKG/Telemetry   []  Cardiology/Vascular   []  Pathology  []  Old records  []  Other:    Assessment & Plan   Assessment / Plan     Assessment:  Sepsis secondary to obstructive uropathy  Pyelonephritis  Tobacco abuse  Right hydronephrosis, hydroureter, and perinephric stranding  Hypomagnesemia  Transaminitis  Leukocytosis  Thrombocytopenia    Plan:  Continue inpatient admission  Urology consulted.  S/p cystoscopy and stent placement  Continue IV Rocephin  Repeat mag in a.m.  Repeat LFTs in a.m.  WBC improving  Platelets stable  Patient declines nicotine patch  Blood cultures pending  Urine culture greater than 100,000 colonies gram-negative bacteria  A.m. labs       Discussed with RN.    VTE Prophylaxis:  Pharmacologic VTE prophylaxis orders are present.        CODE STATUS:   Level Of Support Discussed With: Patient  Code Status (Patient has no pulse and is not breathing): CPR (Attempt to Resuscitate)  Medical Interventions  (Patient has pulse or is breathing): Full Support      Electronically signed by Lakeisha Rueda DO, 9/27/2024, 14:54 EDT.

## 2024-09-27 NOTE — PROGRESS NOTES
[facility]     Progress Note    Patient Name: Madelaine Nation  : 1975  MRN: [id number]  Primary Care Physician:  Makayla Bragg APRN  Date of admission: 2024    Subjective   Subjective     Chief Complaint: Kidney stone    History of Present Illness  Right ureteral stone, acute right pyelonephritis  Flank Pain    Vomiting       Patient Reports patient was admitted to the hospital with right ureteral stone and acute pyelonephritis on the right side with sepsis.  Patient underwent stent placement urgently and has done well postoperatively.  She still as of last night was running a low-grade temperature but this morning he ate she is afebrile.  Her white blood cell count is now 14.68 down from 17.39 yesterday and 21.81 the day before that.  She has good appetite ambulates well.    Review of Systems   Gastrointestinal:  Positive for vomiting.   Genitourinary:  Positive for flank pain.       Objective   Objective     Vitals:   Temp:  [98.4 °F (36.9 °C)-100.6 °F (38.1 °C)] 99.3 °F (37.4 °C)  Heart Rate:  [74-89] 76  Resp:  [16-20] 18  BP: ()/(54-66) 108/58    Physical Exam   Afebrile vital signs are stable  Awake alert oriented  Result Review    Result Review:  I have personally reviewed the results from the time of this admission to 2024 09:35 EDT and agree with these findings:  [x]  Laboratory list / accordion  []  Microbiology  []  Radiology  []  EKG/Telemetry   []  Cardiology/Vascular   []  Pathology  []  Old records  []  Other:  Most notable findings include: Stable      Assessment & Plan   Assessment / Plan     Brief Patient Summary:  Madelaine Nation is a 48 y.o. female who right ureteral stone with sepsis    Active Hospital Problems:  Active Hospital Problems    Diagnosis     **Kidney stone     Sepsis      Plan:   She is doing much better with IV antibiotics.  I recommend patient if continues to do well to be discharged home tomorrow once she has been afebrile for at least 24 hours.   I would recommend her to go home with oral antibiotics and to follow-up with the urology clinic next week to be scheduled for surgery regarding the ureteral stone.    VTE Prophylaxis:  Pharmacologic VTE prophylaxis orders are present.        CODE STATUS:    Level Of Support Discussed With: Patient  Code Status (Patient has no pulse and is not breathing): CPR (Attempt to Resuscitate)  Medical Interventions (Patient has pulse or is breathing): Full Support    Disposition:  I expect patient to be discharged home.    Pietro Villanueva MD

## 2024-09-27 NOTE — PLAN OF CARE
Goal Outcome Evaluation:  Plan of Care Reviewed With: patient        Progress: improving  Outcome Evaluation: VSS. UOP. Afebrile thru shift. Pain controlled per patient; see MAR. Up ad alla.                                None

## 2024-09-28 ENCOUNTER — READMISSION MANAGEMENT (OUTPATIENT)
Dept: CALL CENTER | Facility: HOSPITAL | Age: 49
End: 2024-09-28
Payer: COMMERCIAL

## 2024-09-28 VITALS
TEMPERATURE: 98.6 F | BODY MASS INDEX: 22.42 KG/M2 | RESPIRATION RATE: 16 BRPM | OXYGEN SATURATION: 95 % | DIASTOLIC BLOOD PRESSURE: 59 MMHG | HEART RATE: 75 BPM | SYSTOLIC BLOOD PRESSURE: 94 MMHG | WEIGHT: 114.2 LBS | HEIGHT: 60 IN

## 2024-09-28 LAB
ANION GAP SERPL CALCULATED.3IONS-SCNC: 9.6 MMOL/L (ref 5–15)
BACTERIA SPEC AEROBE CULT: ABNORMAL
BACTERIA SPEC AEROBE CULT: ABNORMAL
BASOPHILS # BLD AUTO: 0.03 10*3/MM3 (ref 0–0.2)
BASOPHILS NFR BLD AUTO: 0.3 % (ref 0–1.5)
BUN SERPL-MCNC: 21 MG/DL (ref 6–20)
BUN/CREAT SERPL: 32.3 (ref 7–25)
CALCIUM SPEC-SCNC: 8.7 MG/DL (ref 8.6–10.5)
CHLORIDE SERPL-SCNC: 105 MMOL/L (ref 98–107)
CO2 SERPL-SCNC: 24.4 MMOL/L (ref 22–29)
CREAT SERPL-MCNC: 0.65 MG/DL (ref 0.57–1)
DEPRECATED RDW RBC AUTO: 44.2 FL (ref 37–54)
EGFRCR SERPLBLD CKD-EPI 2021: 110.1 ML/MIN/1.73
EOSINOPHIL # BLD AUTO: 0.01 10*3/MM3 (ref 0–0.4)
EOSINOPHIL NFR BLD AUTO: 0.1 % (ref 0.3–6.2)
ERYTHROCYTE [DISTWIDTH] IN BLOOD BY AUTOMATED COUNT: 13.3 % (ref 12.3–15.4)
GLUCOSE SERPL-MCNC: 90 MG/DL (ref 65–99)
HCT VFR BLD AUTO: 31 % (ref 34–46.6)
HGB BLD-MCNC: 10.6 G/DL (ref 12–15.9)
IMM GRANULOCYTES # BLD AUTO: 0.06 10*3/MM3 (ref 0–0.05)
IMM GRANULOCYTES NFR BLD AUTO: 0.5 % (ref 0–0.5)
LYMPHOCYTES # BLD AUTO: 1.81 10*3/MM3 (ref 0.7–3.1)
LYMPHOCYTES NFR BLD AUTO: 16.1 % (ref 19.6–45.3)
MCH RBC QN AUTO: 30.9 PG (ref 26.6–33)
MCHC RBC AUTO-ENTMCNC: 34.2 G/DL (ref 31.5–35.7)
MCV RBC AUTO: 90.4 FL (ref 79–97)
MONOCYTES # BLD AUTO: 1.01 10*3/MM3 (ref 0.1–0.9)
MONOCYTES NFR BLD AUTO: 9 % (ref 5–12)
NEUTROPHILS NFR BLD AUTO: 74 % (ref 42.7–76)
NEUTROPHILS NFR BLD AUTO: 8.35 10*3/MM3 (ref 1.7–7)
NRBC BLD AUTO-RTO: 0 /100 WBC (ref 0–0.2)
PLATELET # BLD AUTO: 89 10*3/MM3 (ref 140–450)
PMV BLD AUTO: 12.8 FL (ref 6–12)
POTASSIUM SERPL-SCNC: 4 MMOL/L (ref 3.5–5.2)
RBC # BLD AUTO: 3.43 10*6/MM3 (ref 3.77–5.28)
SODIUM SERPL-SCNC: 139 MMOL/L (ref 136–145)
WBC NRBC COR # BLD AUTO: 11.27 10*3/MM3 (ref 3.4–10.8)

## 2024-09-28 PROCEDURE — 25010000002 ENOXAPARIN PER 10 MG: Performed by: UROLOGY

## 2024-09-28 PROCEDURE — 99239 HOSP IP/OBS DSCHRG MGMT >30: CPT | Performed by: STUDENT IN AN ORGANIZED HEALTH CARE EDUCATION/TRAINING PROGRAM

## 2024-09-28 PROCEDURE — 80048 BASIC METABOLIC PNL TOTAL CA: CPT | Performed by: STUDENT IN AN ORGANIZED HEALTH CARE EDUCATION/TRAINING PROGRAM

## 2024-09-28 PROCEDURE — 85025 COMPLETE CBC W/AUTO DIFF WBC: CPT | Performed by: STUDENT IN AN ORGANIZED HEALTH CARE EDUCATION/TRAINING PROGRAM

## 2024-09-28 PROCEDURE — 25010000002 CEFTRIAXONE PER 250 MG: Performed by: UROLOGY

## 2024-09-28 RX ORDER — CEPHALEXIN 500 MG/1
500 CAPSULE ORAL 2 TIMES DAILY
Qty: 14 CAPSULE | Refills: 0 | Status: SHIPPED | OUTPATIENT
Start: 2024-09-28 | End: 2024-10-05

## 2024-09-28 RX ORDER — FAMOTIDINE 20 MG/1
20 TABLET, FILM COATED ORAL DAILY
Status: DISCONTINUED | OUTPATIENT
Start: 2024-09-28 | End: 2024-09-28 | Stop reason: HOSPADM

## 2024-09-28 RX ORDER — CALCIUM CARBONATE 500 MG/1
2 TABLET, CHEWABLE ORAL 3 TIMES DAILY PRN
Status: DISCONTINUED | OUTPATIENT
Start: 2024-09-28 | End: 2024-09-28 | Stop reason: HOSPADM

## 2024-09-28 RX ADMIN — FAMOTIDINE 20 MG: 20 TABLET ORAL at 08:37

## 2024-09-28 RX ADMIN — ENOXAPARIN SODIUM 30 MG: 100 INJECTION SUBCUTANEOUS at 08:37

## 2024-09-28 RX ADMIN — CEFTRIAXONE SODIUM 2000 MG: 2 INJECTION, POWDER, FOR SOLUTION INTRAMUSCULAR; INTRAVENOUS at 08:37

## 2024-09-28 NOTE — PLAN OF CARE
Goal Outcome Evaluation:  Plan of Care Reviewed With: patient        Progress: improving  Outcome Evaluation: VSS. UOP. Afebrile thru shift. Pain controlled per patient; see MAR. Up ad alla. Dc home self care.

## 2024-09-28 NOTE — DISCHARGE SUMMARY
[facility]         HOSPITALIST  DISCHARGE SUMMARY    Patient Name: Madelaine Nation  : 1975  MRN: [id number]    Date of Admission: 2024  Date of Discharge:  2024    Primary Care Physician: Makayla Bragg APRN    Consults       Date and Time Order Name Status Description    2024  7:11 PM Inpatient Hospitalist Consult      2024  5:59 PM Inpatient Urology Consult              Active and Resolved Hospital Problems:  Active Hospital Problems    Diagnosis POA    **Kidney stone [N20.0] Unknown    Sepsis [A41.9] Yes      Resolved Hospital Problems   No resolved problems to display.       Hospital Course     Hospital Course:  Madelaine Nation is a 48 y.o. female  past medical history of kidney stones who presents with flank pain. Patient has a history of kidney stones.  She saw her primary care doctor today.  Due to her right flank pain there was concern for potential kidney stone.  She has had fever. She was given 1 dose of ceftriaxone at her primary care doctor's office.     Patient admitted for urosepsis.  Febrile with temp 101, heart rate 122, /61, WBC 21, lactic 2.5. Urinalysis shows 1+ bacteria large blood positive nitrite and large leuk esterase.  Blood cultures negative at 48 hours. Patient initiated on IV Rocephin.  CT abdomen with obstructive uropathy on the right with hydronephrosis, hydroureter, and perinephric stranding, bilateral nephrolithiasis.  Urology was consulted and patient went for cystoscopy and stent insertion on 2024.  Urine culture positive for greater than 100,000 colonies E. coli and Klebsiella.  Antibiotics transition to p.o. Keflex by urology team.    Patient discharged in stable condition.    DISCHARGE Follow Up Recommendations for labs and diagnostics: Follow-up with PCP in 1 week.  Follow-up with urology in 2 weeks.      Day of Discharge     Vital Signs:  Temp:  [98.2 °F (36.8 °C)-99.7 °F (37.6 °C)] 98.2 °F (36.8 °C)  Heart Rate:   [50-65] 50  Resp:  [16-18] 16  BP: (114-130)/(66-80) 123/80    Physical Exam:   Gen: NAD, Alert and Oriented  Cards: RRR, no murmur   Pulm: CTA b/l, no wheezing  Abd: soft, nondistended  Extremities: no pitting edema      Discharge Details        Discharge Medications      Patient Not Prescribed Medications Upon Discharge         Allergies   Allergen Reactions    Bactrim [Sulfamethoxazole-Trimethoprim] Hives and Rash    Penicillins Rash       Discharge Disposition:      Diet:  Hospital:  Diet Order   Procedures    Diet: Regular/House; Fluid Consistency: Thin (IDDSI 0)       Discharge Activity:       CODE STATUS:  Code Status and Medical Interventions: CPR (Attempt to Resuscitate); Full Support   Ordered at: 09/25/24 1950     Level Of Support Discussed With:    Patient     Code Status (Patient has no pulse and is not breathing):    CPR (Attempt to Resuscitate)     Medical Interventions (Patient has pulse or is breathing):    Full Support         Future Appointments   Date Time Provider Department Center   1/6/2025  3:30 PM Mallory Lamb DO [facility]           Pertinent  and/or Most Recent Results         LAB RESULTS:      Lab 09/28/24  0354 09/27/24  0411 09/26/24  0409 09/25/24  2139 09/25/24  1631 09/25/24  1548   WBC 11.27* 14.68* 17.39*  --   --  21.81*   HEMOGLOBIN 10.6* 10.7* 11.3*  --   --  13.7   HEMATOCRIT 31.0* 31.8* 33.6*  --   --  39.7   PLATELETS 89* 81* 88*  --   --  117*   NEUTROS ABS 8.35* 13.80* 16.52*  --   --  18.65*   IMMATURE GRANS (ABS) 0.06*  --   --   --   --  0.44*   LYMPHS ABS 1.81  --   --   --   --  1.19   MONOS ABS 1.01*  --   --   --   --  1.28*   EOS ABS 0.01  --   --   --   --  0.18   MCV 90.4 91.9 92.6  --   --  90.4   PROCALCITONIN  --   --  5.02*  --   --   --    LACTATE  --   --   --  1.6 2.5*  --          Lab 09/28/24  0354 09/27/24  0411 09/26/24  0409 09/25/24  1548   SODIUM 139 138 137 135*   POTASSIUM 4.0 4.3 4.3 3.5   CHLORIDE 105 105 106 98   CO2 24.4 24.9 22.8 22.6    ANION GAP 9.6 8.1 8.2 14.4   BUN 21* 15 18 17   CREATININE 0.65 0.67 0.94 1.03*   EGFR 110.1 109.3 75.9 68.1   GLUCOSE 90 107* 152* 182*   CALCIUM 8.7 8.3* 8.3* 9.5   MAGNESIUM  --   --  1.5*  --          Lab 09/26/24  0409 09/25/24  1548   TOTAL PROTEIN 5.2* 7.2   ALBUMIN 3.0* 4.3   GLOBULIN 2.2 2.9   ALT (SGPT) 49* 21   AST (SGOT) 73* 36*   BILIRUBIN 0.6 0.5   ALK PHOS 64 68   LIPASE  --  17                     Brief Urine Lab Results  (Last result in the past 365 days)        Color   Clarity   Blood   Leuk Est   Nitrite   Protein   CREAT   Urine HCG        09/25/24 1720               Negative       09/25/24 1720 Yellow   Turbid   Large (3+)   Large (3+)   Positive   100 mg/dL (2+)                 Microbiology Results (last 10 days)       Procedure Component Value - Date/Time    Blood Culture - Blood, Arm, Left [[id number]]  (Normal) Collected: 09/25/24 1631    Lab Status: Preliminary result Specimen: Blood from Arm, Left Updated: 09/27/24 1645     Blood Culture No growth at 2 days    Blood Culture - Blood, Arm, Left [[id number]]  (Normal) Collected: 09/25/24 1631    Lab Status: Preliminary result Specimen: Blood from Arm, Left Updated: 09/27/24 1645     Blood Culture No growth at 2 days    Urine Culture - Urine, Urine, Clean Catch [[id number]]  (Abnormal) Collected: 09/25/24 1310    Lab Status: Preliminary result Specimen: Urine, Clean Catch Updated: 09/27/24 1139     Urine Culture >100,000 CFU/mL Gram Negative Bacilli    Narrative:      Colonization of the urinary tract without infection is common. Treatment is discouraged unless the patient is symptomatic, pregnant, or undergoing an invasive urologic procedure.            CT Abdomen Pelvis Stone Protocol    Result Date: 9/25/2024  Impression: There is moderate to severe obstructive uropathy on the right with hydronephrosis, hydroureter, and perinephric stranding due to a 0.6 cm stone in the distal right ureter. Bilateral nephrolithiasis measuring up to 0.6 cm on  the right and 0.7 cm on the left. Other findings as above. Electronically Signed: Sergey Harvey MD  9/25/2024 1:09 PM EDT  Workstation ID: [id number]                          Time spent on Discharge including face to face service:  >30 minutes    Electronically signed by Lakeisha Rueda DO, 09/28/24, 8:48 AM EDT.

## 2024-09-28 NOTE — PROGRESS NOTES
[facility]     Progress Note    Patient Name: Madelaine Nation  : 1975  MRN: [id number]  Primary Care Physician:  Makayla Bragg APRN  Date of admission: 2024    Subjective   Subjective     Chief Complaint: No complaints today    Flank Pain    Vomiting       Patient Reports patient is doing well denies any nausea or vomiting tolerating diet ambulating well.    Review of Systems   Gastrointestinal:  Positive for vomiting.   Genitourinary:  Positive for flank pain.       Objective   Objective     Vitals:   Temp:  [98.2 °F (36.8 °C)-99.7 °F (37.6 °C)] 98.2 °F (36.8 °C)  Heart Rate:  [50-63] 50  Resp:  [16-18] 16  BP: (120-130)/(66-80) 123/80    Physical Exam   Afebrile vital signs are stable  Awake alert oriented  Result Review    Result Review:  I have personally reviewed the results from the time of this admission to 2024 11:55 EDT and agree with these findings:  [x]  Laboratory list / accordion  []  Microbiology  []  Radiology  []  EKG/Telemetry   []  Cardiology/Vascular   []  Pathology  []  Old records  []  Other:  Most notable findings include: Stable      Assessment & Plan   Assessment / Plan     Brief Patient Summary:  Madelaine Nation is a 48 y.o. female who patient had a right ureteral stone was caused infection and sepsis and had a stent placed on the right side.    Active Hospital Problems:  Active Hospital Problems    Diagnosis     **Kidney stone     Sepsis      Plan:   Patient will be discharged home she can follow-up with the urology clinic next week and once the infection has cleared she can have the procedures to remove her stone.    VTE Prophylaxis:  Pharmacologic VTE prophylaxis orders are present.        CODE STATUS:    Level Of Support Discussed With: Patient  Code Status (Patient has no pulse and is not breathing): CPR (Attempt to Resuscitate)  Medical Interventions (Patient has pulse or is breathing): Full Support    Disposition:  I expect patient to be discharged  home.    Pietro Villanueva MD

## 2024-09-30 ENCOUNTER — TRANSITIONAL CARE MANAGEMENT TELEPHONE ENCOUNTER (OUTPATIENT)
Dept: CALL CENTER | Facility: HOSPITAL | Age: 49
End: 2024-09-30
Payer: COMMERCIAL

## 2024-09-30 ENCOUNTER — TELEPHONE (OUTPATIENT)
Dept: FAMILY MEDICINE CLINIC | Facility: CLINIC | Age: 49
End: 2024-09-30
Payer: COMMERCIAL

## 2024-09-30 ENCOUNTER — PREP FOR SURGERY (OUTPATIENT)
Dept: OTHER | Facility: HOSPITAL | Age: 49
End: 2024-09-30
Payer: COMMERCIAL

## 2024-09-30 DIAGNOSIS — N20.0 KIDNEY STONE: ICD-10-CM

## 2024-09-30 DIAGNOSIS — N13.2 HYDRONEPHROSIS WITH URINARY OBSTRUCTION DUE TO URETERAL CALCULUS: Primary | ICD-10-CM

## 2024-09-30 DIAGNOSIS — A41.9 SEPSIS, DUE TO UNSPECIFIED ORGANISM, UNSPECIFIED WHETHER ACUTE ORGAN DYSFUNCTION PRESENT: ICD-10-CM

## 2024-09-30 LAB
BACTERIA SPEC AEROBE CULT: NORMAL
BACTERIA SPEC AEROBE CULT: NORMAL

## 2024-09-30 RX ORDER — SODIUM CHLORIDE 9 MG/ML
40 INJECTION, SOLUTION INTRAVENOUS AS NEEDED
OUTPATIENT
Start: 2024-09-30

## 2024-09-30 RX ORDER — SODIUM CHLORIDE 9 MG/ML
100 INJECTION, SOLUTION INTRAVENOUS CONTINUOUS
OUTPATIENT
Start: 2024-09-30

## 2024-09-30 RX ORDER — SODIUM CHLORIDE 0.9 % (FLUSH) 0.9 %
10 SYRINGE (ML) INJECTION AS NEEDED
OUTPATIENT
Start: 2024-09-30

## 2024-09-30 RX ORDER — SODIUM CHLORIDE 0.9 % (FLUSH) 0.9 %
10 SYRINGE (ML) INJECTION EVERY 12 HOURS SCHEDULED
OUTPATIENT
Start: 2024-09-30

## 2024-09-30 NOTE — H&P
[facility]   Urology HISTORY AND PHYSICAL    Patient Name: Madelaine Nation  : 1975  MRN: [id number]  Primary Care Physician:  Makayla Bragg APRN  Date of admission: (Not on file)    Subjective   Subjective       History of Present Illness  Patient has a distal 6 mm right ureteral stone with a right ureteral stent in place and presents for right ureteroscopy, laser lithotripsy and right ureteral stent placement.      Personal History     Past Medical History:   Diagnosis Date    Anxiety 2018    Kidney stone        Past Surgical History:   Procedure Laterality Date    APPENDECTOMY      CYSTOSCOPY W/ URETERAL STENT PLACEMENT Right 2024    Procedure: CYSTOSCOPY URETERAL CATHETER/STENT INSERTION;  Surgeon: Pietro Villanueva MD;  Location: [facility] MAIN OR;  Service: Urology;  Laterality: Right;    TONSILLECTOMY      TUBAL ABDOMINAL LIGATION         Family History: family history includes Anxiety disorder in her mother; Arthritis in her mother; COPD in her father. Otherwise pertinent FHx was reviewed and not pertinent to current issue.    Social History:  reports that she has been smoking cigarettes. She has a 10 pack-year smoking history. She has never been exposed to tobacco smoke. She has never used smokeless tobacco. She reports that she does not currently use alcohol. She reports that she does not use drugs.    Home Medications:  cephalexin    Allergies:  Allergies   Allergen Reactions    Bactrim [Sulfamethoxazole-Trimethoprim] Hives and Rash    Penicillins Rash       Objective    Objective     Vitals:        Physical Exam  Constitutional:       Appearance: Normal appearance.   Cardiovascular:      Rate and Rhythm: Normal rate and regular rhythm.   Pulmonary:      Effort: Pulmonary effort is normal.      Breath sounds: Normal breath sounds.   Neurological:      Mental Status: She is alert. Mental status is at baseline.   Psychiatric:         Mood and Affect: Mood and affect normal.          Speech: Speech normal.         Judgment: Judgment normal.         Result Review    Result Review:  I have personally reviewed the results from the time of this admission to 9/30/2024 16:10 EDT and agree with these findings:  [x]  Laboratory  []  Microbiology  [x]  Radiology  []  EKG/Telemetry   []  Cardiology/Vascular   []  Pathology  [x]  Old records  []  Other:      Assessment & Plan   Assessment / Plan       Active Hospital Problems:  There are no active hospital problems to display for this patient.      Plan: right ureteroscopy, laser lithotripsy and right ureteral stent placement  Risks and benefits discussed with patient and they are agreeable to proceed.    VTE Prophylaxis:  No VTE prophylaxis order currently exists.        CODE STATUS:           Electronically signed by Karen Tyler MD, 09/30/24, 4:10 PM EDT.

## 2024-09-30 NOTE — H&P (VIEW-ONLY)
[facility]   Urology HISTORY AND PHYSICAL    Patient Name: Madelaine Nation  : 1975  MRN: [id number]  Primary Care Physician:  Makayla Bragg APRN  Date of admission: (Not on file)    Subjective   Subjective       History of Present Illness  Patient has a distal 6 mm right ureteral stone with a right ureteral stent in place and presents for right ureteroscopy, laser lithotripsy and right ureteral stent placement.      Personal History     Past Medical History:   Diagnosis Date    Anxiety 2018    Kidney stone        Past Surgical History:   Procedure Laterality Date    APPENDECTOMY      CYSTOSCOPY W/ URETERAL STENT PLACEMENT Right 2024    Procedure: CYSTOSCOPY URETERAL CATHETER/STENT INSERTION;  Surgeon: Pietro Villanueva MD;  Location: [facility] MAIN OR;  Service: Urology;  Laterality: Right;    TONSILLECTOMY      TUBAL ABDOMINAL LIGATION         Family History: family history includes Anxiety disorder in her mother; Arthritis in her mother; COPD in her father. Otherwise pertinent FHx was reviewed and not pertinent to current issue.    Social History:  reports that she has been smoking cigarettes. She has a 10 pack-year smoking history. She has never been exposed to tobacco smoke. She has never used smokeless tobacco. She reports that she does not currently use alcohol. She reports that she does not use drugs.    Home Medications:  cephalexin    Allergies:  Allergies   Allergen Reactions    Bactrim [Sulfamethoxazole-Trimethoprim] Hives and Rash    Penicillins Rash       Objective    Objective     Vitals:        Physical Exam  Constitutional:       Appearance: Normal appearance.   Cardiovascular:      Rate and Rhythm: Normal rate and regular rhythm.   Pulmonary:      Effort: Pulmonary effort is normal.      Breath sounds: Normal breath sounds.   Neurological:      Mental Status: She is alert. Mental status is at baseline.   Psychiatric:         Mood and Affect: Mood and affect normal.          Speech: Speech normal.         Judgment: Judgment normal.         Result Review    Result Review:  I have personally reviewed the results from the time of this admission to 9/30/2024 16:10 EDT and agree with these findings:  [x]  Laboratory  []  Microbiology  [x]  Radiology  []  EKG/Telemetry   []  Cardiology/Vascular   []  Pathology  [x]  Old records  []  Other:      Assessment & Plan   Assessment / Plan       Active Hospital Problems:  There are no active hospital problems to display for this patient.      Plan: right ureteroscopy, laser lithotripsy and right ureteral stent placement  Risks and benefits discussed with patient and they are agreeable to proceed.    VTE Prophylaxis:  No VTE prophylaxis order currently exists.        CODE STATUS:           Electronically signed by Karen Tyler MD, 09/30/24, 4:10 PM EDT.

## 2024-09-30 NOTE — TELEPHONE ENCOUNTER
Caller: PAULINO KITCHEN    Relationship: Emergency Contact    Best call back number: [phone]     Who are you requesting to speak with (clinical staff, provider,  specific staff member): CLINICAL      What was the call regarding: SPOUSE WOULD LIKE A CALL BACK - PATIENT HAD TO HAVE SURGERY, AND SPOUSE HAD TO TAKE OFF TO HELP HER AND TAKE CARE OF CHILD --  HE NEEDS TO KNOW IF Makayla Bragg APRN WOULD FILL OUT FMLA PAPERS FOR HIM.    PLEASE ADVISE

## 2024-10-02 ENCOUNTER — TELEPHONE (OUTPATIENT)
Dept: SURGERY | Facility: CLINIC | Age: 49
End: 2024-10-02
Payer: COMMERCIAL

## 2024-10-02 ENCOUNTER — TELEMEDICINE (OUTPATIENT)
Dept: FAMILY MEDICINE CLINIC | Facility: CLINIC | Age: 49
End: 2024-10-02
Payer: COMMERCIAL

## 2024-10-02 DIAGNOSIS — A41.9 SEPSIS, DUE TO UNSPECIFIED ORGANISM, UNSPECIFIED WHETHER ACUTE ORGAN DYSFUNCTION PRESENT: ICD-10-CM

## 2024-10-02 DIAGNOSIS — N20.0 KIDNEY STONE: ICD-10-CM

## 2024-10-02 DIAGNOSIS — N13.2 HYDRONEPHROSIS WITH URINARY OBSTRUCTION DUE TO URETERAL CALCULUS: ICD-10-CM

## 2024-10-02 DIAGNOSIS — Z09 HOSPITAL DISCHARGE FOLLOW-UP: Primary | ICD-10-CM

## 2024-10-02 PROCEDURE — 99495 TRANSJ CARE MGMT MOD F2F 14D: CPT

## 2024-10-02 NOTE — TELEPHONE ENCOUNTER
Patient is scheduled for surgery on Tuesday. She is currently on antibiotics for UTI. She said her WBC was over 20,000 last week. She wants to know if she needs repeat blood work prior to surgery?  She she is having trace amount of blood when she urinates because she has a stent in place & when she wipes there is pink on the toilet paper. She wants to make sure this is normal?    # [phone]

## 2024-10-02 NOTE — TELEPHONE ENCOUNTER
I informed patient that Dr. Tyler looked at the urine culture results and that the antibiotic prescribed should cover that and patient does not need to have any additional blood work. Also informed that the trace blood when wiping is not unusual. Patient has had stone procedures before and has some questions for Dr. Tyler that plans on asking before going down for surgery on tge day of procedure.

## 2024-10-02 NOTE — PROGRESS NOTES
Madelaine Nation presents to [facility] who presents via telehealth for hospital follow-up.    Patient is present in her home alone, I am present in the office in a patient room alone.    You have chosen to receive care through a telephone visit. Do you consent to use a telephone visit for your medical care today? YES    This was an audio and video enabled telemedicine encounter.     History of Present Illness  This is a 48-year-old female who presents to the clinic via telehealth for hospital follow-up.    Patient was admitted to the hospital on 9/25/2024, discharged on 9/28/2024.  TCM phone call was completed on 9/30/2024.    Patient was admitted to the hospital after being seen in this office by myself, there was concern of kidney stone, stat CT scan was ordered, found out to have moderate to severe hydronephrosis with large stone that was obstructing and causing a lot of damage.  She was also septic at that time as well.  She was urged to go to the ER where she was ultimately admitted.  Patient states that she went that evening and had a stent placed in that ureter to try to allow at least flow and they are planning on going in and removing the kidney stone next Tuesday.  Patient states that she was given several rounds of IV antibiotics, IV fluids, and states that she is feeling a lot better than she was whenever she was admitted.  Did review all blood work with patient.  States that she was concerned about some of the findings as there was some elevation in her white blood cell count, decrease in her hemoglobin, and a decrease in her platelet level.  She is wondering if that needs to be followed up on now that she is discharged.  Patient states her pain is drastically improved, she still has a few days left of the Keflex they sent her home with.  Patient states otherwise she has no major complaints, states that she is urinating just fine, has no longer had a fever.  Able to eat  and drink without any difficulty.    The following portions of the patient's history were personally reviewed and updated as appropriate: allergies, current medications, past medical history, past surgical history, past family history, and past social history.       Objective     All labs, imaging, test results, and specialty provider notes reviewed with patient.     Physical Exam  Constitutional:       Appearance: Normal appearance.   Pulmonary:      Effort: Pulmonary effort is normal.   Musculoskeletal:         General: Normal range of motion.   Neurological:      General: No focal deficit present.      Mental Status: She is alert and oriented to person, place, and time.   Psychiatric:         Mood and Affect: Mood normal.         Behavior: Behavior normal.         Thought Content: Thought content normal.         Judgment: Judgment normal.                         Assessment and Plan:  Diagnoses and all orders for this visit:    1. Hospital discharge follow-up (Primary)    2. Kidney stone  -     CBC Auto Differential; Future  -     Comprehensive Metabolic Panel; Future    3. Hydronephrosis with urinary obstruction due to ureteral calculus  -     CBC Auto Differential; Future  -     Comprehensive Metabolic Panel; Future    4. Sepsis, due to unspecified organism, unspecified whether acute organ dysfunction present  -     CBC Auto Differential; Future  -     Comprehensive Metabolic Panel; Future      Will go ahead and repeat blood work on Friday or Monday of next week.  Discussed with patient to complete full course of antibiotics and continue to follow-up with urology for planned kidney stone removal next week.  Warning signs discussed and when to seek further evaluation via ER as well.  Patient to make sure she is drinking adequately with fluid intake.  Did discuss foods/drinks to avoid to help prevent kidney stones in the future as well.    Follow Up:  No follow-ups on file.    Patient was given instructions and  counseling regarding her condition or for health maintenance advice. Please see specific information pulled into the AVS if appropriate.     Total Time Spent: 15 minutes

## 2024-10-04 ENCOUNTER — LAB (OUTPATIENT)
Dept: LAB | Facility: HOSPITAL | Age: 49
End: 2024-10-04
Payer: COMMERCIAL

## 2024-10-04 DIAGNOSIS — N20.0 KIDNEY STONE: ICD-10-CM

## 2024-10-04 DIAGNOSIS — N13.2 HYDRONEPHROSIS WITH URINARY OBSTRUCTION DUE TO URETERAL CALCULUS: ICD-10-CM

## 2024-10-04 DIAGNOSIS — A41.9 SEPSIS, DUE TO UNSPECIFIED ORGANISM, UNSPECIFIED WHETHER ACUTE ORGAN DYSFUNCTION PRESENT: ICD-10-CM

## 2024-10-04 LAB
ALBUMIN SERPL-MCNC: 4 G/DL (ref 3.5–5.2)
ALBUMIN/GLOB SERPL: 1.5 G/DL
ALP SERPL-CCNC: 60 U/L (ref 39–117)
ALT SERPL W P-5'-P-CCNC: 14 U/L (ref 1–33)
ANION GAP SERPL CALCULATED.3IONS-SCNC: 9.9 MMOL/L (ref 5–15)
AST SERPL-CCNC: 17 U/L (ref 1–32)
BASOPHILS # BLD AUTO: 0.08 10*3/MM3 (ref 0–0.2)
BASOPHILS NFR BLD AUTO: 0.8 % (ref 0–1.5)
BILIRUB SERPL-MCNC: <0.2 MG/DL (ref 0–1.2)
BUN SERPL-MCNC: 10 MG/DL (ref 6–20)
BUN/CREAT SERPL: 14.9 (ref 7–25)
CALCIUM SPEC-SCNC: 9.7 MG/DL (ref 8.6–10.5)
CHLORIDE SERPL-SCNC: 98 MMOL/L (ref 98–107)
CO2 SERPL-SCNC: 30.1 MMOL/L (ref 22–29)
CREAT SERPL-MCNC: 0.67 MG/DL (ref 0.57–1)
DEPRECATED RDW RBC AUTO: 44.1 FL (ref 37–54)
EGFRCR SERPLBLD CKD-EPI 2021: 109.3 ML/MIN/1.73
EOSINOPHIL # BLD AUTO: 0.26 10*3/MM3 (ref 0–0.4)
EOSINOPHIL NFR BLD AUTO: 2.7 % (ref 0.3–6.2)
ERYTHROCYTE [DISTWIDTH] IN BLOOD BY AUTOMATED COUNT: 12.8 % (ref 12.3–15.4)
GLOBULIN UR ELPH-MCNC: 2.7 GM/DL
GLUCOSE SERPL-MCNC: 114 MG/DL (ref 65–99)
HCT VFR BLD AUTO: 35.8 % (ref 34–46.6)
HGB BLD-MCNC: 11.8 G/DL (ref 12–15.9)
IMM GRANULOCYTES # BLD AUTO: 0.12 10*3/MM3 (ref 0–0.05)
IMM GRANULOCYTES NFR BLD AUTO: 1.2 % (ref 0–0.5)
LYMPHOCYTES # BLD AUTO: 3.67 10*3/MM3 (ref 0.7–3.1)
LYMPHOCYTES NFR BLD AUTO: 37.8 % (ref 19.6–45.3)
MCH RBC QN AUTO: 31 PG (ref 26.6–33)
MCHC RBC AUTO-ENTMCNC: 33 G/DL (ref 31.5–35.7)
MCV RBC AUTO: 94 FL (ref 79–97)
MONOCYTES # BLD AUTO: 0.56 10*3/MM3 (ref 0.1–0.9)
MONOCYTES NFR BLD AUTO: 5.8 % (ref 5–12)
NEUTROPHILS NFR BLD AUTO: 5.01 10*3/MM3 (ref 1.7–7)
NEUTROPHILS NFR BLD AUTO: 51.7 % (ref 42.7–76)
NRBC BLD AUTO-RTO: 0 /100 WBC (ref 0–0.2)
PLATELET # BLD AUTO: 374 10*3/MM3 (ref 140–450)
PMV BLD AUTO: 11.5 FL (ref 6–12)
POTASSIUM SERPL-SCNC: 4.2 MMOL/L (ref 3.5–5.2)
PROT SERPL-MCNC: 6.7 G/DL (ref 6–8.5)
RBC # BLD AUTO: 3.81 10*6/MM3 (ref 3.77–5.28)
SODIUM SERPL-SCNC: 138 MMOL/L (ref 136–145)
WBC NRBC COR # BLD AUTO: 9.7 10*3/MM3 (ref 3.4–10.8)

## 2024-10-04 PROCEDURE — 85025 COMPLETE CBC W/AUTO DIFF WBC: CPT

## 2024-10-04 PROCEDURE — 36415 COLL VENOUS BLD VENIPUNCTURE: CPT

## 2024-10-04 PROCEDURE — 80053 COMPREHEN METABOLIC PANEL: CPT

## 2024-10-07 NOTE — PRE-PROCEDURE INSTRUCTIONS
IMPORTANT INSTRUCTIONS - PRE-ADMISSION TESTING  DO NOT EAT/DRINK  OR CHEW anything after midnight the night before your procedure.      Take the following medications the morning of your procedure with JUST A SIP OF WATER:  ___________NO MEDICATION ____________________________________________________________________________________________________________________________________________________________________________    DO NOT BRING your medications to the hospital with you, UNLESS something has changed since your PRE-Admission Testing appointment.  Hold all vitamins, supplements, and NSAIDS (Non- steroidal anti-inflammatory meds) for one week prior to surgery (you MAY take Tylenol or Acetaminophen).  If you are diabetic, check your blood sugar the morning of your procedure. If it is less than 70 or if you are feeling symptomatic, call the following number for further instructions: [phone]_.  Use your inhalers/nebulizers as usual, the morning of your procedure. BRING YOUR INHALERS with you.   Bring your CPAP or BIPAP to hospital, ONLY IF YOU WILL BE SPENDING THE NIGHT.   Make sure you have a ride home and have someone who will stay with you the day of your procedure after you go home.  If you have any questions, please call your Pre-Admission Testing Nurse, EVELYN___ at [phone]___.   Per anesthesia request, do not smoke for 24 hours before your procedure or as instructed by your surgeon.

## 2024-10-08 ENCOUNTER — APPOINTMENT (OUTPATIENT)
Dept: GENERAL RADIOLOGY | Facility: HOSPITAL | Age: 49
End: 2024-10-08
Payer: COMMERCIAL

## 2024-10-08 ENCOUNTER — ANESTHESIA EVENT (OUTPATIENT)
Dept: PERIOP | Facility: HOSPITAL | Age: 49
End: 2024-10-08
Payer: COMMERCIAL

## 2024-10-08 ENCOUNTER — ANESTHESIA (OUTPATIENT)
Dept: PERIOP | Facility: HOSPITAL | Age: 49
End: 2024-10-08
Payer: COMMERCIAL

## 2024-10-08 ENCOUNTER — TELEPHONE (OUTPATIENT)
Dept: UROLOGY | Facility: CLINIC | Age: 49
End: 2024-10-08

## 2024-10-08 ENCOUNTER — HOSPITAL ENCOUNTER (OUTPATIENT)
Facility: HOSPITAL | Age: 49
Setting detail: HOSPITAL OUTPATIENT SURGERY
Discharge: HOME | End: 2024-10-08
Attending: UROLOGY | Admitting: UROLOGY
Payer: COMMERCIAL

## 2024-10-08 VITALS
WEIGHT: 103.62 LBS | OXYGEN SATURATION: 98 % | SYSTOLIC BLOOD PRESSURE: 94 MMHG | RESPIRATION RATE: 18 BRPM | HEIGHT: 60 IN | BODY MASS INDEX: 20.34 KG/M2 | DIASTOLIC BLOOD PRESSURE: 55 MMHG | HEART RATE: 60 BPM | TEMPERATURE: 97.1 F

## 2024-10-08 DIAGNOSIS — N13.2 HYDRONEPHROSIS WITH URINARY OBSTRUCTION DUE TO URETERAL CALCULUS: ICD-10-CM

## 2024-10-08 DIAGNOSIS — A41.9 SEPSIS, DUE TO UNSPECIFIED ORGANISM, UNSPECIFIED WHETHER ACUTE ORGAN DYSFUNCTION PRESENT: ICD-10-CM

## 2024-10-08 DIAGNOSIS — N20.0 KIDNEY STONE: ICD-10-CM

## 2024-10-08 PROCEDURE — 25810000003 LACTATED RINGERS PER 1000 ML: Performed by: ANESTHESIOLOGY

## 2024-10-08 PROCEDURE — 88300 SURGICAL PATH GROSS: CPT | Performed by: UROLOGY

## 2024-10-08 PROCEDURE — C2617 STENT, NON-COR, TEM W/O DEL: HCPCS | Performed by: UROLOGY

## 2024-10-08 PROCEDURE — 25010000002 LIDOCAINE PF 2% 2 % SOLUTION: Performed by: NURSE ANESTHETIST, CERTIFIED REGISTERED

## 2024-10-08 PROCEDURE — 25010000002 FENTANYL CITRATE (PF) 50 MCG/ML SOLUTION: Performed by: NURSE ANESTHETIST, CERTIFIED REGISTERED

## 2024-10-08 PROCEDURE — 25010000002 MIDAZOLAM PER 1MG: Performed by: ANESTHESIOLOGY

## 2024-10-08 PROCEDURE — C1769 GUIDE WIRE: HCPCS | Performed by: UROLOGY

## 2024-10-08 PROCEDURE — 25010000002 PROPOFOL 10 MG/ML EMULSION: Performed by: NURSE ANESTHETIST, CERTIFIED REGISTERED

## 2024-10-08 PROCEDURE — 25010000002 DEXAMETHASONE PER 1 MG: Performed by: NURSE ANESTHETIST, CERTIFIED REGISTERED

## 2024-10-08 PROCEDURE — 25010000002 CEFAZOLIN PER 500 MG: Performed by: UROLOGY

## 2024-10-08 PROCEDURE — 25010000002 ONDANSETRON PER 1 MG: Performed by: NURSE ANESTHETIST, CERTIFIED REGISTERED

## 2024-10-08 PROCEDURE — 82365 CALCULUS SPECTROSCOPY: CPT | Performed by: UROLOGY

## 2024-10-08 PROCEDURE — 76000 FLUOROSCOPY <1 HR PHYS/QHP: CPT

## 2024-10-08 PROCEDURE — 52356 CYSTO/URETERO W/LITHOTRIPSY: CPT | Performed by: UROLOGY

## 2024-10-08 DEVICE — IMPLANTABLE DEVICE: Type: IMPLANTABLE DEVICE | Site: URETER | Status: FUNCTIONAL

## 2024-10-08 RX ORDER — MAGNESIUM HYDROXIDE 1200 MG/15ML
LIQUID ORAL AS NEEDED
Status: DISCONTINUED | OUTPATIENT
Start: 2024-10-08 | End: 2024-10-08 | Stop reason: HOSPADM

## 2024-10-08 RX ORDER — SODIUM CHLORIDE 9 MG/ML
100 INJECTION, SOLUTION INTRAVENOUS CONTINUOUS
Status: DISCONTINUED | OUTPATIENT
Start: 2024-10-08 | End: 2024-10-08 | Stop reason: HOSPADM

## 2024-10-08 RX ORDER — FENTANYL CITRATE 50 UG/ML
INJECTION, SOLUTION INTRAMUSCULAR; INTRAVENOUS AS NEEDED
Status: DISCONTINUED | OUTPATIENT
Start: 2024-10-08 | End: 2024-10-08 | Stop reason: SURG

## 2024-10-08 RX ORDER — ACETAMINOPHEN 500 MG
1000 TABLET ORAL ONCE
Status: COMPLETED | OUTPATIENT
Start: 2024-10-08 | End: 2024-10-08

## 2024-10-08 RX ORDER — SODIUM CHLORIDE, SODIUM LACTATE, POTASSIUM CHLORIDE, CALCIUM CHLORIDE 600; 310; 30; 20 MG/100ML; MG/100ML; MG/100ML; MG/100ML
9 INJECTION, SOLUTION INTRAVENOUS CONTINUOUS PRN
Status: DISCONTINUED | OUTPATIENT
Start: 2024-10-08 | End: 2024-10-08 | Stop reason: HOSPADM

## 2024-10-08 RX ORDER — PROMETHAZINE HYDROCHLORIDE 12.5 MG/1
12.5 TABLET ORAL ONCE AS NEEDED
Status: DISCONTINUED | OUTPATIENT
Start: 2024-10-08 | End: 2024-10-08 | Stop reason: HOSPADM

## 2024-10-08 RX ORDER — LIDOCAINE HYDROCHLORIDE 20 MG/ML
INJECTION, SOLUTION EPIDURAL; INFILTRATION; INTRACAUDAL; PERINEURAL AS NEEDED
Status: DISCONTINUED | OUTPATIENT
Start: 2024-10-08 | End: 2024-10-08 | Stop reason: SURG

## 2024-10-08 RX ORDER — ONDANSETRON 2 MG/ML
INJECTION INTRAMUSCULAR; INTRAVENOUS AS NEEDED
Status: DISCONTINUED | OUTPATIENT
Start: 2024-10-08 | End: 2024-10-08 | Stop reason: SURG

## 2024-10-08 RX ORDER — SODIUM CHLORIDE 9 MG/ML
40 INJECTION, SOLUTION INTRAVENOUS AS NEEDED
Status: DISCONTINUED | OUTPATIENT
Start: 2024-10-08 | End: 2024-10-08 | Stop reason: HOSPADM

## 2024-10-08 RX ORDER — ACETAMINOPHEN 325 MG/1
650 TABLET ORAL ONCE
Status: DISCONTINUED | OUTPATIENT
Start: 2024-10-08 | End: 2024-10-08 | Stop reason: HOSPADM

## 2024-10-08 RX ORDER — IBUPROFEN 600 MG/1
600 TABLET, FILM COATED ORAL EVERY 6 HOURS PRN
Status: DISCONTINUED | OUTPATIENT
Start: 2024-10-08 | End: 2024-10-08 | Stop reason: HOSPADM

## 2024-10-08 RX ORDER — SCOLOPAMINE TRANSDERMAL SYSTEM 1 MG/1
1 PATCH, EXTENDED RELEASE TRANSDERMAL ONCE
Status: DISCONTINUED | OUTPATIENT
Start: 2024-10-08 | End: 2024-10-08 | Stop reason: HOSPADM

## 2024-10-08 RX ORDER — FAMOTIDINE 10 MG/ML
20 INJECTION, SOLUTION INTRAVENOUS
Status: COMPLETED | OUTPATIENT
Start: 2024-10-08 | End: 2024-10-08

## 2024-10-08 RX ORDER — TRAMADOL HYDROCHLORIDE 50 MG/1
50 TABLET ORAL EVERY 6 HOURS PRN
Qty: 15 TABLET | Refills: 0 | Status: SHIPPED | OUTPATIENT
Start: 2024-10-08 | End: 2024-10-16

## 2024-10-08 RX ORDER — PROMETHAZINE HYDROCHLORIDE 25 MG/1
25 SUPPOSITORY RECTAL ONCE AS NEEDED
Status: DISCONTINUED | OUTPATIENT
Start: 2024-10-08 | End: 2024-10-08 | Stop reason: HOSPADM

## 2024-10-08 RX ORDER — PROMETHAZINE HYDROCHLORIDE 12.5 MG/1
25 TABLET ORAL ONCE AS NEEDED
Status: DISCONTINUED | OUTPATIENT
Start: 2024-10-08 | End: 2024-10-08 | Stop reason: HOSPADM

## 2024-10-08 RX ORDER — SODIUM CHLORIDE 0.9 % (FLUSH) 0.9 %
10 SYRINGE (ML) INJECTION EVERY 12 HOURS SCHEDULED
Status: DISCONTINUED | OUTPATIENT
Start: 2024-10-08 | End: 2024-10-08 | Stop reason: HOSPADM

## 2024-10-08 RX ORDER — OXYCODONE HYDROCHLORIDE 5 MG/1
5 TABLET ORAL
Status: DISCONTINUED | OUTPATIENT
Start: 2024-10-08 | End: 2024-10-08 | Stop reason: HOSPADM

## 2024-10-08 RX ORDER — ONDANSETRON 2 MG/ML
4 INJECTION INTRAMUSCULAR; INTRAVENOUS ONCE AS NEEDED
Status: DISCONTINUED | OUTPATIENT
Start: 2024-10-08 | End: 2024-10-08 | Stop reason: HOSPADM

## 2024-10-08 RX ORDER — MEPERIDINE HYDROCHLORIDE 25 MG/ML
12.5 INJECTION INTRAMUSCULAR; INTRAVENOUS; SUBCUTANEOUS
Status: DISCONTINUED | OUTPATIENT
Start: 2024-10-08 | End: 2024-10-08 | Stop reason: HOSPADM

## 2024-10-08 RX ORDER — MIDAZOLAM HYDROCHLORIDE 2 MG/2ML
2 INJECTION, SOLUTION INTRAMUSCULAR; INTRAVENOUS ONCE
Status: COMPLETED | OUTPATIENT
Start: 2024-10-08 | End: 2024-10-08

## 2024-10-08 RX ORDER — PHENYLEPHRINE HCL IN 0.9% NACL 1 MG/10 ML
SYRINGE (ML) INTRAVENOUS AS NEEDED
Status: DISCONTINUED | OUTPATIENT
Start: 2024-10-08 | End: 2024-10-08 | Stop reason: SURG

## 2024-10-08 RX ORDER — PROPOFOL 10 MG/ML
VIAL (ML) INTRAVENOUS AS NEEDED
Status: DISCONTINUED | OUTPATIENT
Start: 2024-10-08 | End: 2024-10-08 | Stop reason: SURG

## 2024-10-08 RX ORDER — DEXAMETHASONE SODIUM PHOSPHATE 4 MG/ML
INJECTION, SOLUTION INTRA-ARTICULAR; INTRALESIONAL; INTRAMUSCULAR; INTRAVENOUS; SOFT TISSUE AS NEEDED
Status: DISCONTINUED | OUTPATIENT
Start: 2024-10-08 | End: 2024-10-08 | Stop reason: SURG

## 2024-10-08 RX ORDER — OXYCODONE AND ACETAMINOPHEN 5; 325 MG/1; MG/1
1 TABLET ORAL EVERY 6 HOURS PRN
Qty: 12 TABLET | Refills: 0 | Status: SHIPPED | OUTPATIENT
Start: 2024-10-08 | End: 2024-10-16

## 2024-10-08 RX ORDER — SODIUM CHLORIDE 0.9 % (FLUSH) 0.9 %
10 SYRINGE (ML) INJECTION AS NEEDED
Status: DISCONTINUED | OUTPATIENT
Start: 2024-10-08 | End: 2024-10-08 | Stop reason: HOSPADM

## 2024-10-08 RX ADMIN — MIDAZOLAM HYDROCHLORIDE 2 MG: 1 INJECTION, SOLUTION INTRAMUSCULAR; INTRAVENOUS at 09:49

## 2024-10-08 RX ADMIN — PROPOFOL 130 MG: 10 INJECTION, EMULSION INTRAVENOUS at 10:04

## 2024-10-08 RX ADMIN — FENTANYL CITRATE 25 MCG: 50 INJECTION, SOLUTION INTRAMUSCULAR; INTRAVENOUS at 10:20

## 2024-10-08 RX ADMIN — LIDOCAINE HYDROCHLORIDE 40 MG: 20 INJECTION, SOLUTION INTRAVENOUS at 10:04

## 2024-10-08 RX ADMIN — FENTANYL CITRATE 50 MCG: 50 INJECTION, SOLUTION INTRAMUSCULAR; INTRAVENOUS at 10:45

## 2024-10-08 RX ADMIN — Medication 50 MCG: at 10:14

## 2024-10-08 RX ADMIN — ONDANSETRON HYDROCHLORIDE 4 MG: 2 SOLUTION INTRAMUSCULAR; INTRAVENOUS at 10:10

## 2024-10-08 RX ADMIN — FENTANYL CITRATE 25 MCG: 50 INJECTION, SOLUTION INTRAMUSCULAR; INTRAVENOUS at 10:02

## 2024-10-08 RX ADMIN — SODIUM CHLORIDE, POTASSIUM CHLORIDE, SODIUM LACTATE AND CALCIUM CHLORIDE 9 ML/HR: 600; 310; 30; 20 INJECTION, SOLUTION INTRAVENOUS at 09:50

## 2024-10-08 RX ADMIN — PROPOFOL 200 MCG/KG/MIN: 10 INJECTION, EMULSION INTRAVENOUS at 10:05

## 2024-10-08 RX ADMIN — SCOPALAMINE 1 PATCH: 1 PATCH, EXTENDED RELEASE TRANSDERMAL at 09:42

## 2024-10-08 RX ADMIN — Medication 100 MCG: at 10:07

## 2024-10-08 RX ADMIN — SODIUM CHLORIDE 2 G: 9 INJECTION, SOLUTION INTRAVENOUS at 10:00

## 2024-10-08 RX ADMIN — Medication 100 MCG: at 10:19

## 2024-10-08 RX ADMIN — DEXAMETHASONE SODIUM PHOSPHATE 8 MG: 4 INJECTION, SOLUTION INTRAMUSCULAR; INTRAVENOUS at 10:06

## 2024-10-08 RX ADMIN — FAMOTIDINE 20 MG: 10 INJECTION INTRAVENOUS at 09:42

## 2024-10-08 RX ADMIN — ACETAMINOPHEN 1000 MG: 500 TABLET ORAL at 09:41

## 2024-10-08 NOTE — OP NOTE
URETEROSCOPY LASER LITHOTRIPSY WITH STENT INSERTION  Procedure Report    Patient Name:  Madelaine Nation  YOB: 1975    Date of Surgery:  10/8/2024     Pre-op Diagnosis:   Hydronephrosis with urinary obstruction due to ureteral calculus [N13.2]  Kidney stone [N20.0]  Sepsis, due to unspecified organism, unspecified whether acute organ dysfunction present [A41.9]       Post-Op Diagnosis Codes:     * Hydronephrosis with urinary obstruction due to ureteral calculus [N13.2]     * Kidney stone [N20.0]     * Sepsis, due to unspecified organism, unspecified whether acute organ dysfunction present [A41.9]      Procedure/CPT® Codes:    Procedure(s):  RIGHT URETEROSCOPY,  LASER LITHOTRIPSY,  BASKET STONE EXRACTION WITH RIGHT URETERAL STENT EXCHANGE      Staff:  Surgeon(s):  Karen Tyler MD         Anesthesia: General    Estimated Blood Loss: none    Implants:    Implant Name Type Inv. Item Serial No.  Lot No. LRB No. Used Action   STNT URETRL SOFCURL 4.5F 22CM - [id number] Stent STNT URETRL SOFCURL 4.5F 22CM  [facility] [id number] Right 1 Implanted   STNT URETRL ASCERTA 6F 26CM - [id number] Stent STNT URETRL ASCERTA 6F 26CM  [employer] [id number] Right 1 Explanted       Specimen:          Specimens       ID Source Type Tests Collected By Collected At Frozen?    A Ureter, Right Calculus TISSUE PATHOLOGY EXAM  STONE ANALYSIS   Karen Tyler MD 10/8/24 1023     Description: RIGHT URETERAL STONE                Complications: None    Description of Procedure:     After proper consent was obtained, patient was taken to operating room and placed in the dorsal lithotomy position.  The patient was prepped and draped in the normal sterile fashion for a right ureteroscopy.      A 22 Yi rigid cystoscopy was passed per urethra into the bladder.  The bladder was inspected in a systemic meridian fashion.  No stones, tumors or other abnormalities were seen.  There was a stent coming  from the right UO.  It was grasped and removed.  During removal of the stent the distal stone came out of the ureter and into the bladder.    A glide wire was passed up the right ureteral orifice without difficulty.  A dual lumen catheter was placed and a second wire was placed as a safety wire.  Over the working wire a ureteral access sheath was passed.  A flexible scope was then passed into the ureter.  The right renal pelvis stone was encountered in the right lower pole.       A 270 laser fiber was used to break the stone up into several small pieces.      A stone basket was placed into the ureter and the stones were grasped and removed.      There was no evidence of injury to the ureter.  The ureteroscope was removed.  Over the wire, a 4.5 [place] 22 cm stent was passed.  Under fluoroscopy it was seen curling in the renal pelvis and under cystoscopy was seen curling in the bladder.  The cystoscope was removed.      A string was left on the stent.      The patient tolerated the procedure well and was transferred to the PACU in stable condition.        Karen Tyler MD     Date: 10/8/2024  Time: 11:25 EDT

## 2024-10-08 NOTE — TELEPHONE ENCOUNTER
Spoke with patient informing her that the sepsis was also a diagnosis during he emergency room visit on 9/25. That is why the on-call urologist only placed a stent in her kidney/ureter. She was given an antibiotic through her IV today and usually no antibiotics are sent home with patients. She was instructed on when and how to remove her stent. She will call back with any issues or concerns.

## 2024-10-08 NOTE — ANESTHESIA PREPROCEDURE EVALUATION
Anesthesia Evaluation     Patient summary reviewed and Nursing notes reviewed   history of anesthetic complications:  PONV  NPO Solid Status: > 8 hours  NPO Liquid Status: > 2 hours           Airway   Mallampati: I  TM distance: >3 FB  Neck ROM: full  Dental    (+) upper dentures    Pulmonary - negative pulmonary ROS and normal exam   (+) a smoker Current,  Cardiovascular - negative cardio ROS and normal exam  Exercise tolerance: good (4-7 METS)        Neuro/Psych- negative ROS  (+) psychiatric history Anxiety  GI/Hepatic/Renal/Endo - negative ROS   (+) renal disease- stones    Musculoskeletal (-) negative ROS    Abdominal  - normal exam   Substance History - negative use     OB/GYN negative ob/gyn ROS         Other - negative ROS       ROS/Med Hx Other: PAT Nursing Notes unavailable.               Anesthesia Plan    ASA 2     general   total IV anesthesia  (Requests TIVA )  intravenous induction     Anesthetic plan, risks, benefits, and alternatives have been provided, discussed and informed consent has been obtained with: patient.    Plan discussed with CRNA.    CODE STATUS:

## 2024-10-08 NOTE — DISCHARGE INSTRUCTIONS
DISCHARGE INSTRUCTIONS  Extracorporeal Shock Wave Lithotripsy (ESWL)/ Ureteroscopy Lasertripsy      For your surgery you had:  General anesthesia (you may have a sore throat for the first 24 hours)  You received a medicated path for nausea prevention today (behind your ear). It is recommended that you remove it 24-48 hours post-operatively. It must be removed within 72 hours.   You may experience dizziness, drowsiness, or lightheadedness for several hours following surgery.  Do not stay alone today or tonight.  Limit your activity for 24 hours.  You should not drive or operate machinery, drink alcohol, or sign legally binding documents for 24 hours or while you are taking pain medication.  Resume your diet slowly.  Follow any special dietary instructions you may have been given by your doctor.     NOTIFY YOUR DOCTOR IF YOU EXPERIENCE ANY OF THE FOLLOWING:  Temperature greater than 101 degrees Fahrenheit  Shaking Chills  Redness or excessive drainage from incision  Nausea, vomiting and/or pain that is not controlled by prescribed medications  Increase in bleeding or bleeding that is excessive  Unable to urinate in 6 hours after surgery  If unable to reach your doctor, please go to the closest Emergency Room  Strain urine if instructed by physician.  Collect any fragments and take with you on your scheduled appointment. You may pass stone pieces or small blood clots.  Blood in your urine is normal.  It could be light pink to cherry color.  Urine will be bloody for several days.   Drink 6-8 glasses of fluid each day to assist with passing of stone fragments.  Back pain is common.  It may feel like a dull ache or back spasm.  If you have a stent, it must be managed by your urologist.  Do NOT forget.      SPECIAL INSTRUCTIONS:  If string is present, remove stent in 5 days (Sunday morning).  your shower, grasp string. With slow steady pressure pull stent straight out.      Last dose of pain medication was given  at:  Tylenol (1000mg) last at 9:41am. Do not exceed 4000mg of tylenol in a 24 hour period.

## 2024-10-08 NOTE — ANESTHESIA POSTPROCEDURE EVALUATION
Patient: Madelaine Nation    Procedure Summary       Date: 10/08/24 Room / Location: [facility] OR  / formerly [facility] MAIN OR    Anesthesia Start: 1000 Anesthesia Stop: 1057    Procedure: RIGHT URETEROSCOPY LASER LITHOTRIPSY BASKET STONE EXRACTION WITH RIGHT URETERAL STENT EXCHANGE (Right: Ureter) Diagnosis:       Hydronephrosis with urinary obstruction due to ureteral calculus      Kidney stone      Sepsis, due to unspecified organism, unspecified whether acute organ dysfunction present      (Hydronephrosis with urinary obstruction due to ureteral calculus [N13.2])      (Kidney stone [N20.0])      (Sepsis, due to unspecified organism, unspecified whether acute organ dysfunction present [A41.9])    Surgeons: Karen Tyler MD Provider: Esau Mckinley MD    Anesthesia Type: general ASA Status: 2            Anesthesia Type: general    Vitals  Vitals Value Taken Time   BP 81/54 10/08/24 1113   Temp     Pulse 59 10/08/24 1114   Resp     SpO2 94 % 10/08/24 1114   Vitals shown include unfiled device data.        Post Anesthesia Care and Evaluation    Patient location during evaluation: bedside  Patient participation: complete - patient participated  Level of consciousness: awake  Pain management: adequate    Airway patency: patent  PONV Status: none  Cardiovascular status: acceptable and stable  Respiratory status: acceptable  Hydration status: acceptable

## 2024-10-08 NOTE — TELEPHONE ENCOUNTER
PATIENT HAD SURGERY TODAY.  SHE SAID ON HER DISCHARGE PAPERS, THE POST-OP DIAGNOSIS IS SEPSIS.  SHE WANTS TO KNOW IF SHE IS STILL SEPTIC.  SHE FINISHED HER ANTIBIOTIC SATURDAY, AND DR. CURTIS DIDN'T PRESCRIBE AN ANTIBIOTIC TODAY.    PHARMACY VERIFIED WITH PATIENT.    #[phone]

## 2024-10-09 ENCOUNTER — TELEPHONE (OUTPATIENT)
Dept: FAMILY MEDICINE CLINIC | Facility: CLINIC | Age: 49
End: 2024-10-09
Payer: COMMERCIAL

## 2024-10-09 LAB
LAB AP CASE REPORT: NORMAL
LAB AP CLINICAL INFORMATION: NORMAL
PATH REPORT.FINAL DX SPEC: NORMAL
PATH REPORT.GROSS SPEC: NORMAL

## 2024-10-09 NOTE — TELEPHONE ENCOUNTER
Please advise,     called and spoke with Madelaine, advised that Makayla is out of office today and to allow up to 48 hours for response.

## 2024-10-09 NOTE — TELEPHONE ENCOUNTER
Caller: Madelaine Nation    Relationship: Self    Best call back number: [phone]    Additional notes: PATIENT HAD THE KIDNEY STONE REMOVED BUT SHE IS WANTING TO KNOW IF SHE NEEDS TO COME BACK TO HAVE LABS COMPLETED AGAIN TO CHECK THE LEVEL OUT AGAIN OR NOT. SHE IS REQUESTING CALL TO LET HER KNOW.

## 2024-10-10 ENCOUNTER — TELEPHONE (OUTPATIENT)
Dept: FAMILY MEDICINE CLINIC | Facility: CLINIC | Age: 49
End: 2024-10-10
Payer: COMMERCIAL

## 2024-10-10 ENCOUNTER — TELEPHONE (OUTPATIENT)
Dept: UROLOGY | Facility: CLINIC | Age: 49
End: 2024-10-10
Payer: COMMERCIAL

## 2024-10-10 DIAGNOSIS — N20.0 KIDNEY STONE: Primary | ICD-10-CM

## 2024-10-10 NOTE — TELEPHONE ENCOUNTER
I spoke with patient advising that the spasms are normal with a stent in place. Once the stent is removed the spasms should subside. Patient will call back with any issues or concerns.

## 2024-10-10 NOTE — TELEPHONE ENCOUNTER
Provider: LEE ALBRECHT    Caller: PAULINO KITCHEN    Relationship to Patient:     Pharmacy:     Phone Number:     [phone]       Reason for Call: CALLER WOULD LIKE FOR LEE TO CALL HIM REGARDING FMLA PAPERWORK ASAP    [employer] SHOULD HAVE FAXED YOU THE PAPERWORK THE BEGINNING OF THE WEEK

## 2024-10-10 NOTE — TELEPHONE ENCOUNTER
PATIENT CALLED STATING SHE IS HAVING SPASMS ON HER RIGHT SIDE. SHE STATED SHE IS SUPPOSED TO TAKE HER STINT OUT ON SUNDAY AND WANTS TO KNOW IF THIS IS NORMAL?

## 2024-10-11 NOTE — TELEPHONE ENCOUNTER
Caller: PAULINO KITCHEN    Relationship to patient: Emergency Contact    Best call back number: [phone]    Patient is needing: PATIENTS  CALLED REQUESTING TO SPEAK WITH CLINICAL STAFF REGARDING THE PATIENTS FMLA. CALLER STATES HE DID RECEIVE AN EMAIL STATING THE FMLA WAS APPROVED BUT IT WAS APPROVED FROM 10.1.24 TO 10.17.24 BUT CALLER WAS OFF WORK ON 9.25, 9.26, AND 9.27 AFTER PATIENT WAS SEEN AND SENT TO THE ER FOR THE EMERGENCY SURGERY AND SEPSIS. CALLER WANTING TO KNOW IF THESE DATES COULD BE CHANGED.

## 2024-10-11 NOTE — TELEPHONE ENCOUNTER
Informed patient that her urethral stent should not cause indigestion. Patient will need to speak with her pcp.

## 2024-10-11 NOTE — TELEPHONE ENCOUNTER
PATIENT CALLED STATING SHE IS NOW HAVING INDIGESTION THAT IS CAUSING PAIN IN ABDOMEN UP TO HER SHOULDERS. SHE STATES SHE ISN'T ABLE TO SLEEP BECAUSE IT IS SO BAD. SHE STATED SHE HAS HAD STENTS BEFORE BUT DIDN'T HAVE THIS ISSUE.

## 2024-10-14 NOTE — TELEPHONE ENCOUNTER
Caller: PAULINO KITCHEN    Relationship: Emergency Contact    Best call back number: [phone]     What is the best time to reach you: ANY    Who are you requesting to speak with (clinical staff, provider,  specific staff member): GARETT    What was the call regarding: NEEDING UPDATE ON [facility] PAPERWORK STATUS--  IT HAS TO BE SUBMITTED BY NEXT WEEK 10/21    DATES THAT HAVE ALREADY BEEN  APPROVED 10/01-10/17 ( EVEN THOU HE WAS NOT OFF FOR THAT ENTIRE TIME, HE WAS ONLY OFF 10/07, 10/8, 10/09 AND TODAY 10/14, AND WILL TAKE 10/16)     BUT NEEDS ADDITIONAL DATES ADDED -- 9/25, 9/26 AND 9/27     Is it okay if the provider responds through [employer]: YES

## 2024-10-14 NOTE — TELEPHONE ENCOUNTER
TALKED WITH PCP - THE DATES ARE CORRECT ON THE PAPERWORK       TALKED WITH THE PATIENT AND      HE SAID HE GOT AN EMAIL WITH APPROVED DAYS 10/1/2024 - 10/16/2024 AND GO BACK TO WORK ON 10/17/2024      I REFAXED THE PAPERWORK

## 2024-10-16 ENCOUNTER — OFFICE VISIT (OUTPATIENT)
Dept: UROLOGY | Facility: CLINIC | Age: 49
End: 2024-10-16
Payer: COMMERCIAL

## 2024-10-16 VITALS
HEIGHT: 60 IN | DIASTOLIC BLOOD PRESSURE: 74 MMHG | WEIGHT: 103 LBS | SYSTOLIC BLOOD PRESSURE: 121 MMHG | BODY MASS INDEX: 20.22 KG/M2

## 2024-10-16 DIAGNOSIS — N20.0 KIDNEY STONE: Primary | ICD-10-CM

## 2024-10-16 LAB
BILIRUB BLD-MCNC: NEGATIVE MG/DL
CLARITY, POC: CLEAR
COLOR UR: YELLOW
EXPIRATION DATE: ABNORMAL
GLUCOSE UR STRIP-MCNC: NEGATIVE MG/DL
KETONES UR QL: NEGATIVE
LEUKOCYTE EST, POC: ABNORMAL
Lab: ABNORMAL
NITRITE UR-MCNC: NEGATIVE MG/ML
PH UR: 6 [PH] (ref 5–8)
PROT UR STRIP-MCNC: ABNORMAL MG/DL
RBC # UR STRIP: ABNORMAL /UL
SP GR UR: 1.02 (ref 1–1.03)
UROBILINOGEN UR QL: ABNORMAL

## 2024-10-16 NOTE — PROGRESS NOTES
"Chief Complaint  Kidney stone    Subjective          Madelaine Nation presents to [facility]  History of Present Illness  Patient is here status post ureteroscopy.  Stent is removed.  She has no complaints currently.      Objective   Vital Signs:   /74   Ht 152.4 cm (60\")   Wt 46.7 kg (103 lb)   BMI 20.12 kg/m²       Physical Exam  Vitals and nursing note reviewed.   Constitutional:       Appearance: Normal appearance. She is well-developed.   Pulmonary:      Effort: Pulmonary effort is normal.      Breath sounds: Normal air entry.   Neurological:      Mental Status: She is alert and oriented to person, place, and time.      Motor: Motor function is intact.   Psychiatric:         Mood and Affect: Mood normal.         Behavior: Behavior normal.          Result Review :   The following data was reviewed by: Karen Tyler MD on 10/16/2024:    Results for orders placed or performed in visit on 10/16/24   POC Urinalysis Dipstick, Automated    Collection Time: 10/16/24  9:07 AM    Specimen: Urine   Result Value Ref Range    Color Yellow Yellow, Straw, Dark Yellow, Ofe    Clarity, UA Clear Clear    Specific Gravity  1.025 1.005 - 1.030    pH, Urine 6.0 5.0 - 8.0    Leukocytes Small (1+) (A) Negative    Nitrite, UA Negative Negative    Protein, POC Trace (A) Negative mg/dL    Glucose, UA Negative Negative mg/dL    Ketones, UA Negative Negative    Urobilinogen, UA 0.2 E.U./dL Normal, 0.2 E.U./dL    Bilirubin Negative Negative    Blood, UA Large (A) Negative    Lot Number [id number]     Expiration Date 82,025               Assessment and Plan    Diagnoses and all orders for this visit:    1. Kidney stone (Primary)  -     POC Urinalysis Dipstick, Automated  -     XR Abdomen KUB; Future    KUB and follow-up in 1 year.    The patient has done well after surgery with no apparent complications. I have discussed the postoperative course to date, as well as, the expected progress going forward. " The patient understands what potential complications that may arise in the future. Stone prevention measures were again discussed and handouts were given and patient will RTC in one year for repeat KUB.    Stone prevention measures were again discussed and stone prevention handouts were given.    The uteretral stent was removed at home without difficulty. The patient will return to the office in 12 months for KUB. I will see the patient sooner if there are other problems.            Follow Up       No follow-ups on file.  Patient was given instructions and counseling regarding her condition or for health maintenance advice. Please see specific information pulled into the AVS if appropriate.

## 2024-10-17 LAB
CALCIUM OXALATE DIHYDRATE MFR STONE IR: 30 %
COLOR STONE: NORMAL
COM MFR STONE: 60 %
COMPN STONE: NORMAL
HYDROXYAPATITE: 10 %
LABORATORY COMMENT REPORT: NORMAL
LABORATORY COMMENT REPORT: NORMAL
Lab: NORMAL
Lab: NORMAL
PHOTO: NORMAL
SIZE STONE: NORMAL MM
SPEC SOURCE SUBJ: NORMAL
STONE ANALYSIS-IMP: NORMAL
WT STONE: 42 MG

## 2024-12-03 ENCOUNTER — TELEPHONE (OUTPATIENT)
Dept: OBSTETRICS AND GYNECOLOGY | Facility: CLINIC | Age: 49
End: 2024-12-03

## 2024-12-03 NOTE — TELEPHONE ENCOUNTER
[id number]: need to reschedule your appointment with dr herring on [id number], she is not here that day. Please call the office to reschedule

## 2024-12-12 ENCOUNTER — OFFICE VISIT (OUTPATIENT)
Dept: FAMILY MEDICINE CLINIC | Facility: CLINIC | Age: 49
End: 2024-12-12
Payer: COMMERCIAL

## 2024-12-12 VITALS
OXYGEN SATURATION: 100 % | TEMPERATURE: 97.6 F | SYSTOLIC BLOOD PRESSURE: 120 MMHG | HEIGHT: 60 IN | WEIGHT: 109.5 LBS | BODY MASS INDEX: 21.5 KG/M2 | DIASTOLIC BLOOD PRESSURE: 82 MMHG | HEART RATE: 89 BPM

## 2024-12-12 DIAGNOSIS — F33.1 MODERATE EPISODE OF RECURRENT MAJOR DEPRESSIVE DISORDER: ICD-10-CM

## 2024-12-12 DIAGNOSIS — G47.00 INSOMNIA, UNSPECIFIED TYPE: ICD-10-CM

## 2024-12-12 DIAGNOSIS — F43.21 GRIEF: ICD-10-CM

## 2024-12-12 DIAGNOSIS — F41.9 ANXIETY: Primary | ICD-10-CM

## 2024-12-12 PROCEDURE — 99213 OFFICE O/P EST LOW 20 MIN: CPT

## 2024-12-12 RX ORDER — CITALOPRAM HYDROBROMIDE 10 MG/1
5 TABLET ORAL DAILY
Qty: 45 TABLET | Refills: 1 | Status: SHIPPED | OUTPATIENT
Start: 2024-12-12

## 2024-12-12 NOTE — PROGRESS NOTES
Madelaine Nation presents to [facility] with complaints of increased anxiety, depression, grief, discuss going back on medication.      History of Present Illness  This is a 48-year-old female who presents to clinic with complaints of increased anxiety, depression, grief, and to discuss going back on medication.    Patient states that she has been on medication in the past, has had anxiety in the past, and when she took the Celexa at 5 mg states that it really did help her.  Patient states that she has had a lot of events going on in her life recently that have been really difficult for her.  It all started with a really bad kidney stone that had to have surgery, she got really sick from this, neck she was admitted to the hospital a few months ago in regards to this.  All of that has resolved but since then she is also had a lot of personal matters going on as well.  States that her daughter had delivered a baby back in November, the baby was born about a month early and did have Down syndrome.  Patient states that everything was looking okay, he was going to require surgery but he was stable, and then states they placed a feeding tube in 1 evening and then the next day he ended up coding and dying.  Patient states that there is not been a lot of closure there, there is been a lot of grief that is come with that, and states that she has tried to be supportive of her daughter during this major loss.  States that she has also taken a big hit from this but she is trying to be strong for everybody else but does think it is time to go back on the medication.  She also states that she is not sleeping well, states that she may sleep hard for the first hour or 2 but then she will wake up and her mind will race and replay through all of the events that have happened recently.  States that she is just got a lot on her plate.  She denies suicidal thoughts or ideation but would like to go back on  "Celexa.  Has not tried thing over-the-counter for sleep.    The following portions of the patient's history were personally reviewed and updated as appropriate: allergies, current medications, past medical history, past surgical history, past family history, and past social history.       Objective   Vital Signs:   /82 (BP Location: Left arm, Patient Position: Sitting, Cuff Size: Adult)   Pulse 89   Temp 97.6 °F (36.4 °C)   Ht 152.4 cm (60\")   Wt 49.7 kg (109 lb 8 oz)   SpO2 100%   BMI 21.39 kg/m²     Body mass index is 21.39 kg/m².    All labs, imaging, test results, and specialty provider notes reviewed with patient.     Physical Exam  Vitals reviewed.   Constitutional:       Appearance: Normal appearance.   Cardiovascular:      Rate and Rhythm: Normal rate and regular rhythm.      Pulses: Normal pulses.      Heart sounds: Normal heart sounds.   Pulmonary:      Effort: Pulmonary effort is normal.      Breath sounds: Normal breath sounds.   Neurological:      General: No focal deficit present.      Mental Status: She is alert and oriented to person, place, and time.                  BMI is within normal parameters. No other follow-up for BMI required.            Assessment and Plan:  Diagnoses and all orders for this visit:    1. Anxiety (Primary)  -     citalopram (CeleXA) 10 MG tablet; Take 0.5 tablets by mouth Daily.  Dispense: 45 tablet; Refill: 1    2. Grief    3. Insomnia, unspecified type    4. Moderate episode of recurrent major depressive disorder  -     citalopram (CeleXA) 10 MG tablet; Take 0.5 tablets by mouth Daily.  Dispense: 45 tablet; Refill: 1      Situational instances has definitely made mental health worse, we will go ahead and place patient back on Celexa at 5 mg.  Did discuss it may take a few weeks to get in system to notice a major difference.  Strict ER precautions for any suicidal thoughts or ideation.  Also discussed with patient that I would like for her to try a few " medications over-the-counter to try to help with sleep.  Patient can try Benadryl, as I do want her to try to get some rest and keep her mind from racing.  If it is not effective, patient can reach out we can discuss other options at that time.  Patient also going to get in with counseling/grief therapy as well and do definitely recommend this.  Patient to follow-up if any new symptoms develop or if not adequately controlled via Celexa.    Follow Up:  No follow-ups on file.    Patient was given instructions and counseling regarding her condition or for health maintenance advice. Please see specific information pulled into the AVS if appropriate.

## 2025-01-21 ENCOUNTER — OFFICE VISIT (OUTPATIENT)
Dept: FAMILY MEDICINE CLINIC | Facility: CLINIC | Age: 50
End: 2025-01-21
Payer: COMMERCIAL

## 2025-01-21 VITALS
SYSTOLIC BLOOD PRESSURE: 138 MMHG | OXYGEN SATURATION: 98 % | HEART RATE: 77 BPM | HEIGHT: 60 IN | WEIGHT: 110.7 LBS | RESPIRATION RATE: 18 BRPM | DIASTOLIC BLOOD PRESSURE: 82 MMHG | BODY MASS INDEX: 21.73 KG/M2 | TEMPERATURE: 97.4 F

## 2025-01-21 DIAGNOSIS — N30.90 CYSTITIS: Primary | ICD-10-CM

## 2025-01-21 DIAGNOSIS — N30.01 ACUTE CYSTITIS WITH HEMATURIA: ICD-10-CM

## 2025-01-21 LAB
BILIRUB BLD-MCNC: NEGATIVE MG/DL
CLARITY, POC: CLEAR
COLOR UR: YELLOW
EXPIRATION DATE: ABNORMAL
GLUCOSE UR STRIP-MCNC: NEGATIVE MG/DL
KETONES UR QL: NEGATIVE
LEUKOCYTE EST, POC: ABNORMAL
Lab: ABNORMAL
NITRITE UR-MCNC: POSITIVE MG/ML
PH UR: 7 [PH] (ref 5–8)
PROT UR STRIP-MCNC: NEGATIVE MG/DL
RBC # UR STRIP: ABNORMAL /UL
SP GR UR: 1.01 (ref 1–1.03)
UROBILINOGEN UR QL: ABNORMAL

## 2025-01-21 PROCEDURE — 87086 URINE CULTURE/COLONY COUNT: CPT

## 2025-01-21 PROCEDURE — 87077 CULTURE AEROBIC IDENTIFY: CPT

## 2025-01-21 PROCEDURE — 81003 URINALYSIS AUTO W/O SCOPE: CPT

## 2025-01-21 PROCEDURE — 87186 SC STD MICRODIL/AGAR DIL: CPT

## 2025-01-21 PROCEDURE — 99213 OFFICE O/P EST LOW 20 MIN: CPT

## 2025-01-21 RX ORDER — CIPROFLOXACIN 500 MG/1
500 TABLET, FILM COATED ORAL 2 TIMES DAILY
Qty: 10 TABLET | Refills: 0 | Status: SHIPPED | OUTPATIENT
Start: 2025-01-21

## 2025-01-21 NOTE — PROGRESS NOTES
"Madelaine Nation presents to [facility] with complaints of left flank pain, concern for UTI/kidney stone.      History of Present Illness  This is a 49-year-old female who presents to clinic with complaints of left flank pain and concern for UTI/kidney stone.    Patient has a longstanding history of a lot of issues with kidney stones, most recently she had a pretty significant kidney stone that caused severe hydronephrosis and urosepsis due to severe infection.  Ever since that timeframe, she has been really leery on any type of urinary symptoms and comes in immediately to get things evaluated and treated if needed.  Patient states that her symptoms started about 2 days ago, started with some pain in the back left kidney area, but nothing nearly as intense as when she has had kidney stones in the past.  States that it is tender to palpation, she has noticed that she has been more the bathroom more frequently but not having any pain with urination.  Also states that she just does not feel really good and feels more rundown, but she is not having any intensive nausea or vomiting and not in any intense pain.  And thus is here for evaluation.  Did obtain urinalysis today while in the office.  Denies any fever/chills/body aches, never has burning with urination when she has UTIs.    The following portions of the patient's history were personally reviewed and updated as appropriate: allergies, current medications, past medical history, past surgical history, past family history, and past social history.       Objective   Vital Signs:   /82   Pulse 77   Temp 97.4 °F (36.3 °C)   Resp 18   Ht 152.4 cm (60\")   Wt 50.2 kg (110 lb 11.2 oz)   SpO2 98%   BMI 21.62 kg/m²     Body mass index is 21.62 kg/m².    All labs, imaging, test results, and specialty provider notes reviewed with patient.     Physical Exam  Vitals reviewed.   Constitutional:       Appearance: Normal appearance. "   Cardiovascular:      Rate and Rhythm: Normal rate and regular rhythm.      Pulses: Normal pulses.      Heart sounds: Normal heart sounds.   Pulmonary:      Effort: Pulmonary effort is normal.      Breath sounds: Normal breath sounds.   Abdominal:      Tenderness: There is left CVA tenderness.   Neurological:      General: No focal deficit present.      Mental Status: She is alert and oriented to person, place, and time.                  BMI is within normal parameters. No other follow-up for BMI required.            Assessment and Plan:  Diagnoses and all orders for this visit:    1. Cystitis (Primary)  -     POCT urinalysis dipstick, automated  -     Urine Culture - Urine, Urine, Clean Catch    2. Acute cystitis with hematuria  -     ciprofloxacin (Cipro) 500 MG tablet; Take 1 tablet by mouth 2 (Two) Times a Day.  Dispense: 10 tablet; Refill: 0      Upon urinalysis exam it does appear the patient has a UTI, so we will go ahead and treat aggressively with antibiotics and send urine off for culture.  Did discuss with patient that if she is having worsening symptoms within the next 24 to 48 hours, at that time we need would need to order a stat scan or she would need to go to the ER for evaluation.  She verbalized understanding.  Make sure she is drinking plenty of water and did discuss that extensively.    Follow Up:  No follow-ups on file.    Patient was given instructions and counseling regarding her condition or for health maintenance advice. Please see specific information pulled into the AVS if appropriate.

## 2025-01-23 ENCOUNTER — TELEPHONE (OUTPATIENT)
Dept: FAMILY MEDICINE CLINIC | Facility: CLINIC | Age: 50
End: 2025-01-23
Payer: COMMERCIAL

## 2025-01-23 LAB — BACTERIA SPEC AEROBE CULT: ABNORMAL

## 2025-01-23 NOTE — TELEPHONE ENCOUNTER
Patient is still having pain on left side and no improvement.  She has been on medication but also has a history of kidney stones which caused her to go septic in September 2024.  She said Makayla said to let her know if she was not improving.

## 2025-01-27 ENCOUNTER — TELEPHONE (OUTPATIENT)
Dept: FAMILY MEDICINE CLINIC | Facility: CLINIC | Age: 50
End: 2025-01-27
Payer: COMMERCIAL

## 2025-01-27 NOTE — TELEPHONE ENCOUNTER
Caller: Madelaine Nation    Relationship: Self    Best call back number:        What is the best time to reach you: ANYTIME     Who are you requesting to speak with (clinical staff, provider,  specific staff member): CLINICAL          What was the call regarding:       THE PATIENT SAID SHE TOOK HER 6TH DOSE OF THE     ciprofloxacin (Cipro) 500 MG tablet      ON FRIDAY MORNING  AND SHE STARTED HAVING PAIN IN HER BACK AND LEGS. SHE SAID SHE WAS HAVING TROUBLE WALKING AS WELL. SHE SAID SHE SPOKE TO THE PHARMACIST AND WAS TOLD THAT CAN BE SIDE EFFECT FROM THIS MEDICATION. SHE SAID SHE IS STILL HAVING SOME MINOR PAIN.

## 2025-01-27 NOTE — TELEPHONE ENCOUNTER
Pt wanted me to add that to her allergy list, I have added. Pt is to come by and leave another urine to recheck to see if another abx is needed.

## 2025-01-28 ENCOUNTER — CLINICAL SUPPORT (OUTPATIENT)
Dept: FAMILY MEDICINE CLINIC | Facility: CLINIC | Age: 50
End: 2025-01-28
Payer: COMMERCIAL

## 2025-01-28 DIAGNOSIS — R39.9 UTI SYMPTOMS: Primary | ICD-10-CM

## 2025-01-28 LAB
BILIRUB BLD-MCNC: NEGATIVE MG/DL
CLARITY, POC: CLEAR
COLOR UR: YELLOW
EXPIRATION DATE: NORMAL
GLUCOSE UR STRIP-MCNC: NEGATIVE MG/DL
KETONES UR QL: NEGATIVE
LEUKOCYTE EST, POC: NEGATIVE
Lab: NORMAL
NITRITE UR-MCNC: NEGATIVE MG/ML
PH UR: 7 [PH] (ref 5–8)
PROT UR STRIP-MCNC: NEGATIVE MG/DL
RBC # UR STRIP: NEGATIVE /UL
SP GR UR: 1.01 (ref 1–1.03)
UROBILINOGEN UR QL: NORMAL

## 2025-01-28 PROCEDURE — 81003 URINALYSIS AUTO W/O SCOPE: CPT

## 2025-01-28 PROCEDURE — 87086 URINE CULTURE/COLONY COUNT: CPT

## 2025-01-30 LAB — BACTERIA SPEC AEROBE CULT: NO GROWTH

## 2025-04-09 ENCOUNTER — TELEMEDICINE (OUTPATIENT)
Dept: FAMILY MEDICINE CLINIC | Facility: CLINIC | Age: 50
End: 2025-04-09
Payer: COMMERCIAL

## 2025-04-09 DIAGNOSIS — J01.00 ACUTE NON-RECURRENT MAXILLARY SINUSITIS: Primary | ICD-10-CM

## 2025-04-09 PROCEDURE — 99213 OFFICE O/P EST LOW 20 MIN: CPT | Performed by: NURSE PRACTITIONER

## 2025-04-09 RX ORDER — AZITHROMYCIN 250 MG/1
TABLET, FILM COATED ORAL
Qty: 6 TABLET | Refills: 0 | Status: SHIPPED | OUTPATIENT
Start: 2025-04-09

## 2025-04-09 NOTE — PROGRESS NOTES
Chief Complaint  Sinus Problem (Sinus congestion and pressure. )    Subjective         Madelaine Nation presents to [facility]  Telehealth completed for the patient.  Consent obtained.  Patient does complain of sinus pressure and congestion.  She states s symptoms started approximately 2 weeks ago.  She states that she started having a lot of thick green mucus.  She states that maxillary sinuses feel very full.  She denies any teeth pain.  Patient denies any headaches, fevers general body aches.  She denies any nausea vomiting diarrhea.    Sinus Problem         Objective     There were no vitals filed for this visit.   There is no height or weight on file to calculate BMI.    BMI is within normal parameters. No other follow-up for BMI required.        Physical Exam  Constitutional:       Appearance: Normal appearance.   Pulmonary:      Effort: Pulmonary effort is normal.   Neurological:      General: No focal deficit present.      Mental Status: She is alert and oriented to person, place, and time.   Psychiatric:         Mood and Affect: Mood normal.         Behavior: Behavior normal.          Result Review :   The following data was reviewed by: LEE Reid on 04/09/2025:      Procedures    Assessment and Plan   Diagnoses and all orders for this visit:    1. Acute non-recurrent maxillary sinusitis (Primary)  -     azithromycin (Zithromax Z-Sudeep) 250 MG tablet; Take 2 tablets by mouth on day 1, then 1 tablet daily on days 2-5  Dispense: 6 tablet; Refill: 0          Follow Up   Return if symptoms worsen or fail to improve.  Patient was given instructions and counseling regarding her condition or for health maintenance advice. Please see specific information pulled into the AVS if appropriate.

## 2025-04-21 ENCOUNTER — OFFICE VISIT (OUTPATIENT)
Dept: FAMILY MEDICINE CLINIC | Facility: CLINIC | Age: 50
End: 2025-04-21
Payer: COMMERCIAL

## 2025-04-21 VITALS
HEIGHT: 60 IN | OXYGEN SATURATION: 97 % | BODY MASS INDEX: 21.93 KG/M2 | HEART RATE: 92 BPM | DIASTOLIC BLOOD PRESSURE: 82 MMHG | SYSTOLIC BLOOD PRESSURE: 130 MMHG | WEIGHT: 111.7 LBS | TEMPERATURE: 97.6 F

## 2025-04-21 DIAGNOSIS — N39.0 URINARY TRACT INFECTION WITHOUT HEMATURIA, SITE UNSPECIFIED: Primary | ICD-10-CM

## 2025-04-21 DIAGNOSIS — R30.0 DYSURIA: ICD-10-CM

## 2025-04-21 LAB
BILIRUB BLD-MCNC: NEGATIVE MG/DL
CLARITY, POC: ABNORMAL
COLOR UR: YELLOW
EXPIRATION DATE: ABNORMAL
GLUCOSE UR STRIP-MCNC: NEGATIVE MG/DL
KETONES UR QL: NEGATIVE
LEUKOCYTE EST, POC: ABNORMAL
Lab: ABNORMAL
NITRITE UR-MCNC: POSITIVE MG/ML
PH UR: 7.5 [PH] (ref 5–8)
PROT UR STRIP-MCNC: NEGATIVE MG/DL
RBC # UR STRIP: NEGATIVE /UL
SP GR UR: 1.02 (ref 1–1.03)
UROBILINOGEN UR QL: NORMAL

## 2025-04-21 PROCEDURE — 87186 SC STD MICRODIL/AGAR DIL: CPT

## 2025-04-21 PROCEDURE — 87088 URINE BACTERIA CULTURE: CPT

## 2025-04-21 PROCEDURE — 87086 URINE CULTURE/COLONY COUNT: CPT

## 2025-04-21 RX ORDER — CEPHALEXIN 500 MG/1
500 CAPSULE ORAL 2 TIMES DAILY
Qty: 14 CAPSULE | Refills: 0 | Status: SHIPPED | OUTPATIENT
Start: 2025-04-21 | End: 2025-04-28

## 2025-04-21 RX ORDER — PHENAZOPYRIDINE HYDROCHLORIDE 200 MG/1
200 TABLET, FILM COATED ORAL 3 TIMES DAILY PRN
Qty: 6 TABLET | Refills: 0 | Status: SHIPPED | OUTPATIENT
Start: 2025-04-21

## 2025-04-21 NOTE — PROGRESS NOTES
"Chief Complaint  Dysuria and Urinary Frequency    Subjective      Madelaine Nation is a 49 y.o. female who presents to [facility] complains of burning with urination and urinary frequency x 5 days.  She reports a history of kidney stones and infection.  She denies fever or other vaginal complaints.  Patient states that she thought it may have been a kidney stone so she waited to be evaluated.      Objective   Vital Signs:   Vitals:    04/21/25 1543   BP: 130/82   BP Location: Left arm   Patient Position: Sitting   Cuff Size: Adult   Pulse: 92   Temp: 97.6 °F (36.4 °C)   SpO2: 97%   Weight: 50.7 kg (111 lb 11.2 oz)   Height: 152.4 cm (60\")     Body mass index is 21.81 kg/m².    Wt Readings from Last 3 Encounters:   04/21/25 50.7 kg (111 lb 11.2 oz)   01/21/25 50.2 kg (110 lb 11.2 oz)   12/12/24 49.7 kg (109 lb 8 oz)     BP Readings from Last 3 Encounters:   04/21/25 130/82   01/21/25 138/82   12/12/24 120/82       Health Maintenance   Topic Date Due    PAP SMEAR  Never done    MAMMOGRAM  Never done    HEPATITIS C SCREENING  Never done    ANNUAL PHYSICAL  Never done    COLORECTAL CANCER SCREENING  Never done    COVID-19 Vaccine (1 - 2024-25 season) Never done    Pneumococcal Vaccine 0-49 (1 of 2 - PCV) 12/12/2025 (Originally 12/20/1996)    TDAP/TD VACCINES (1 - Tdap) 01/21/2026 (Originally 12/20/1996)    INFLUENZA VACCINE  07/01/2025       Physical Exam  Vitals and nursing note reviewed.   HENT:      Head: Normocephalic and atraumatic.   Eyes:      Conjunctiva/sclera: Conjunctivae normal.   Cardiovascular:      Rate and Rhythm: Normal rate.   Pulmonary:      Effort: Pulmonary effort is normal.   Abdominal:      Palpations: Abdomen is soft.      Tenderness: There is no abdominal tenderness. There is no right CVA tenderness, left CVA tenderness, guarding or rebound.   Musculoskeletal:         General: Normal range of motion.      Cervical back: Normal range of motion.   Skin:     General: " Skin is warm and dry.   Neurological:      Mental Status: She is alert and oriented to person, place, and time.   Psychiatric:         Mood and Affect: Mood normal.          Result Review :  The following data was reviewed by: LEE Diamond on 04/21/2025:         Procedures          ASSESSMENT/PLAN  Diagnoses and all orders for this visit:    1. Urinary tract infection without hematuria, site unspecified (Primary)  -     Urine Culture - Urine, Urine, Clean Catch  -     cephalexin (KEFLEX) 500 MG capsule; Take 1 capsule by mouth 2 (Two) Times a Day for 7 days.  Dispense: 14 capsule; Refill: 0  -     phenazopyridine (PYRIDIUM) 200 MG tablet; Take 1 tablet by mouth 3 (Three) Times a Day As Needed for Bladder Spasms.  Dispense: 6 tablet; Refill: 0    2. Dysuria  -     POCT urinalysis dipstick, automated  -     Cancel: Urine Culture - Urine, Urine, Clean Catch  -     Urine Culture - Urine, Urine, Clean Catch        BMI is within normal parameters. No other follow-up for BMI required.       Madelaine CLARK Rios  reports that she has been smoking cigarettes. She has a 10 pack-year smoking history. She has never been exposed to tobacco smoke. She has never used smokeless tobacco. I have educated her on the risk of diseases from using tobacco products such as cancer, COPD, and heart disease.                FOLLOW UP  Return if symptoms worsen or fail to improve.  Patient was given instructions and counseling regarding her condition or for health maintenance advice. Please see specific information pulled into the AVS if appropriate.       LEE Diamond  04/21/25  16:18 EDT

## 2025-04-23 LAB — BACTERIA SPEC AEROBE CULT: ABNORMAL

## 2025-05-09 ENCOUNTER — OFFICE VISIT (OUTPATIENT)
Dept: FAMILY MEDICINE CLINIC | Facility: CLINIC | Age: 50
End: 2025-05-09
Payer: COMMERCIAL

## 2025-05-09 VITALS
BODY MASS INDEX: 22.26 KG/M2 | DIASTOLIC BLOOD PRESSURE: 68 MMHG | SYSTOLIC BLOOD PRESSURE: 122 MMHG | HEIGHT: 60 IN | TEMPERATURE: 98.3 F | HEART RATE: 88 BPM | WEIGHT: 113.4 LBS | OXYGEN SATURATION: 97 %

## 2025-05-09 DIAGNOSIS — Z71.1 WORRIED WELL: ICD-10-CM

## 2025-05-09 DIAGNOSIS — R10.9 ACUTE LEFT FLANK PAIN: ICD-10-CM

## 2025-05-09 DIAGNOSIS — R39.9 UTI SYMPTOMS: Primary | ICD-10-CM

## 2025-05-09 LAB
BILIRUB BLD-MCNC: NEGATIVE MG/DL
CLARITY, POC: CLEAR
COLOR UR: YELLOW
EXPIRATION DATE: NORMAL
GLUCOSE UR STRIP-MCNC: NEGATIVE MG/DL
KETONES UR QL: NEGATIVE
LEUKOCYTE EST, POC: NEGATIVE
Lab: NORMAL
NITRITE UR-MCNC: NEGATIVE MG/ML
PH UR: 6.5 [PH] (ref 5–8)
PROT UR STRIP-MCNC: NEGATIVE MG/DL
RBC # UR STRIP: NEGATIVE /UL
SP GR UR: 1.01 (ref 1–1.03)
UROBILINOGEN UR QL: NORMAL

## 2025-05-09 PROCEDURE — 99213 OFFICE O/P EST LOW 20 MIN: CPT | Performed by: NURSE PRACTITIONER

## 2025-05-09 PROCEDURE — 81003 URINALYSIS AUTO W/O SCOPE: CPT | Performed by: NURSE PRACTITIONER

## 2025-05-09 NOTE — PROGRESS NOTES
"Chief Complaint  Urinary Tract Infection (Pt states she has hx of kidney stones in her L kidney. She states after her visit on 4/21/2025 she completed 5 of 7 days of keflex and the symptoms had subsided until yesterday. She denies any symptoms other than flank pain on her L side. )    Subjective         Madelaine Nation presents to [facility]  Presents to the office for a follow-up regarding her UTI.  Patient states that she does have a history of kidney stones and has a difficult time determining whether she has a stone, urinary tract infection or pyelonephritis.  I did explain that we would do a urinalysis on her in the office.  Denies any pain at this time.  She denies any flank pain or dysuria.    Urinary Tract Infection       Objective     Vitals:    05/09/25 1525   BP: 122/68   BP Location: Right arm   Patient Position: Sitting   Cuff Size: Adult   Pulse: 88   Temp: 98.3 °F (36.8 °C)   TempSrc: Temporal   SpO2: 97%   Weight: 51.4 kg (113 lb 6.4 oz)   Height: 152.4 cm (60\")      Body mass index is 22.15 kg/m².    BMI is within normal parameters. No other follow-up for BMI required.        Physical Exam  Vitals reviewed.   Constitutional:       Appearance: Normal appearance.   Cardiovascular:      Rate and Rhythm: Normal rate and regular rhythm.      Pulses: Normal pulses.      Heart sounds: Normal heart sounds, S1 normal and S2 normal. No murmur heard.  Pulmonary:      Effort: Pulmonary effort is normal. No respiratory distress.      Breath sounds: Normal breath sounds.   Skin:     General: Skin is warm and dry.   Neurological:      Mental Status: She is alert and oriented to person, place, and time.   Psychiatric:         Attention and Perception: Attention normal.         Mood and Affect: Mood normal.         Behavior: Behavior normal.          Result Review :   The following data was reviewed by: LEE Reid on 05/09/2025:      Procedures    Assessment and Plan "   Diagnoses and all orders for this visit:    1. UTI symptoms (Primary)  -     Urine Culture - Urine, Urine, Clean Catch; Future  -     POC Urinalysis Dipstick, Automated    2. Acute left flank pain  -     Urine Culture - Urine, Urine, Clean Catch; Future  -     POC Urinalysis Dipstick, Automated    3. Worried well      Urinalysis was unremarkable.  No blood in the specimen as well.    Follow Up   Return if symptoms worsen or fail to improve.  Patient was given instructions and counseling regarding her condition or for health maintenance advice. Please see specific information pulled into the AVS if appropriate.

## 2025-05-12 ENCOUNTER — TELEPHONE (OUTPATIENT)
Dept: FAMILY MEDICINE CLINIC | Facility: CLINIC | Age: 50
End: 2025-05-12
Payer: COMMERCIAL

## 2025-05-12 RX ORDER — NITROFURANTOIN 25; 75 MG/1; MG/1
100 CAPSULE ORAL 2 TIMES DAILY
Qty: 14 CAPSULE | Refills: 0 | Status: SHIPPED | OUTPATIENT
Start: 2025-05-12 | End: 2025-05-12 | Stop reason: ALTCHOICE

## 2025-05-12 RX ORDER — CEPHALEXIN 500 MG/1
500 CAPSULE ORAL 4 TIMES DAILY
Qty: 28 CAPSULE | Refills: 0 | Status: SHIPPED | OUTPATIENT
Start: 2025-05-12 | End: 2025-05-19

## 2025-05-12 NOTE — TELEPHONE ENCOUNTER
Per Otilio, I have changed the medication to keflex. We can cancel the macrobid that was sent earlier.     Called and spoke with Madelaine, informed her that medication has been changed. Madelaine verbalized understanding

## 2025-05-12 NOTE — TELEPHONE ENCOUNTER
Caller: Madelaine Nation    Relationship: Self    Best call back number:   [phone]    What medication are you requesting: cephalexin (KEFLEX) 500 MG capsule [9500] (Order [id number])     PATIENT STATES [facility]CR[employer] GIVES HER ANXIETY AND THE KEFLEX SHE PREFERS IF THAT CAN BE TAKEN IN PLACE OF THE MACROBID     If a prescription is needed, what is your preferred pharmacy and phone number: [id number] - [place], [place] - [address] AT [facility] [phone] [facility] [phone] FX       PLEASE RETURN CALL INFORM PATIENT WHICH MEDICATION TO TAKE

## 2025-07-30 ENCOUNTER — TELEPHONE (OUTPATIENT)
Dept: FAMILY MEDICINE CLINIC | Facility: CLINIC | Age: 50
End: 2025-07-30
Payer: COMMERCIAL

## 2025-07-30 ENCOUNTER — OFFICE VISIT (OUTPATIENT)
Dept: FAMILY MEDICINE CLINIC | Facility: CLINIC | Age: 50
End: 2025-07-30
Payer: COMMERCIAL

## 2025-07-30 VITALS
DIASTOLIC BLOOD PRESSURE: 74 MMHG | SYSTOLIC BLOOD PRESSURE: 118 MMHG | HEART RATE: 94 BPM | OXYGEN SATURATION: 98 % | BODY MASS INDEX: 22.07 KG/M2 | WEIGHT: 112.4 LBS | HEIGHT: 60 IN | TEMPERATURE: 97.9 F | RESPIRATION RATE: 16 BRPM

## 2025-07-30 DIAGNOSIS — N39.0 FREQUENT UTI: Primary | ICD-10-CM

## 2025-07-30 LAB
BILIRUB BLD-MCNC: NEGATIVE MG/DL
CLARITY, POC: CLEAR
COLOR UR: YELLOW
EXPIRATION DATE: NORMAL
GLUCOSE UR STRIP-MCNC: NEGATIVE MG/DL
KETONES UR QL: NEGATIVE
LEUKOCYTE EST, POC: NEGATIVE
Lab: NORMAL
NITRITE UR-MCNC: NEGATIVE MG/ML
PH UR: 7.5 [PH] (ref 5–8)
PROT UR STRIP-MCNC: NEGATIVE MG/DL
RBC # UR STRIP: NEGATIVE /UL
SP GR UR: 1.01 (ref 1–1.03)
UROBILINOGEN UR QL: NORMAL

## 2025-07-30 PROCEDURE — 81003 URINALYSIS AUTO W/O SCOPE: CPT | Performed by: NURSE PRACTITIONER

## 2025-07-30 PROCEDURE — 87086 URINE CULTURE/COLONY COUNT: CPT | Performed by: NURSE PRACTITIONER

## 2025-07-30 PROCEDURE — 99213 OFFICE O/P EST LOW 20 MIN: CPT | Performed by: NURSE PRACTITIONER

## 2025-07-30 RX ORDER — CEPHALEXIN 500 MG/1
500 CAPSULE ORAL 3 TIMES DAILY
Qty: 21 CAPSULE | Refills: 0 | Status: SHIPPED | OUTPATIENT
Start: 2025-07-30 | End: 2025-08-06

## 2025-07-30 NOTE — PROGRESS NOTES
Chief Complaint  UTI symptoms (Flank pain - pt has had kidney stones and also has gone septic before)    Subjective        Madelaine Nation presents to [facility]  History of Present Illness  Having uti symptoms that started Sunday and did go away and notes that has been having frequency.  Does have stones in kidney on the left she knows from last year      The following portions of the patient's history were personally reviewed and updated as appropriate: allergies, current medications, past medical history, past surgical history, past family history, and past social history.     Body mass index is 21.95 kg/m².    BMI is within normal parameters. No other follow-up for BMI required.      Past History:    Medical History: has a past medical history of Anxiety (2018), Arthritis, Depression, Kidney stone (2015), PONV (postoperative nausea and vomiting), Urinary tract infection, and Vaginal infection.     Surgical History: has a past surgical history that includes Appendectomy; Tubal ligation; Tonsillectomy; Cystoscopy w/ ureteral stent placement (Right, 09/25/2024); Kidney stone surgery; and ureteroscopy laser lithotripsy with stent insertion (Right, 10/8/2024).     Family History: family history includes Anxiety disorder in her mother; Arthritis in her mother; COPD in her father.     Social History: reports that she has been smoking cigarettes. She has a 10 pack-year smoking history. She has never been exposed to tobacco smoke. She has never used smokeless tobacco. She reports that she does not currently use alcohol. She reports that she does not use drugs.    Allergies: Ciprofloxacin, Bactrim [sulfamethoxazole-trimethoprim], and Penicillins          Current Outpatient Medications:     citalopram (CeleXA) 10 MG tablet, Take 0.5 tablets by mouth Daily., Disp: 45 tablet, Rfl: 1    cephalexin (KEFLEX) 500 MG capsule, Take 1 capsule by mouth 3 (Three) Times a Day for 7 days., Disp: 21  "capsule, Rfl: 0    phenazopyridine (PYRIDIUM) 200 MG tablet, Take 1 tablet by mouth 3 (Three) Times a Day As Needed for Bladder Spasms. (Patient not taking: Reported on 5/9/2025), Disp: 6 tablet, Rfl: 0    There are no discontinued medications.      Review of Systems   Constitutional:  Negative for fever.   Respiratory:  Negative for cough and shortness of breath.    Cardiovascular:  Negative for chest pain, palpitations and leg swelling.   Neurological:  Negative for dizziness, weakness, numbness and headache.        Objective         Vitals:    07/30/25 1508   BP: 118/74   BP Location: Right arm   Patient Position: Sitting   Cuff Size: Adult   Pulse: 94   Resp: 16   Temp: 97.9 °F (36.6 °C)   TempSrc: Temporal   SpO2: 98%   Weight: 51 kg (112 lb 6.4 oz)   Height: 152.4 cm (60\")     Body mass index is 21.95 kg/m².         Physical Exam  Vitals reviewed.   Constitutional:       Appearance: Normal appearance. She is well-developed.   HENT:      Head: Normocephalic and atraumatic.      Mouth/Throat:      Pharynx: No oropharyngeal exudate.   Eyes:      Conjunctiva/sclera: Conjunctivae normal.      Pupils: Pupils are equal, round, and reactive to light.   Cardiovascular:      Rate and Rhythm: Normal rate and regular rhythm.      Heart sounds: Normal heart sounds. No murmur heard.     No friction rub. No gallop.   Pulmonary:      Effort: Pulmonary effort is normal.      Breath sounds: Normal breath sounds. No wheezing or rhonchi.   Abdominal:      General: Bowel sounds are normal.      Palpations: Abdomen is soft.      Tenderness: There is no abdominal tenderness. There is no right CVA tenderness or left CVA tenderness.   Musculoskeletal:      Cervical back: Normal range of motion.   Skin:     General: Skin is warm and dry.   Neurological:      Mental Status: She is alert and oriented to person, place, and time.   Psychiatric:         Mood and Affect: Mood and affect normal.         Behavior: Behavior normal.         " Thought Content: Thought content normal.         Judgment: Judgment normal.             Result Review :               Assessment and Plan     Diagnoses and all orders for this visit:    1. Frequent UTI (Primary)  -     POC Urinalysis Dipstick, Automated  -     Urine Culture - Urine, Urine, Clean Catch  -     cephalexin (KEFLEX) 500 MG capsule; Take 1 capsule by mouth 3 (Three) Times a Day for 7 days.  Dispense: 21 capsule; Refill: 0              Follow Up     Return for Next scheduled follow up.    Patient was given instructions and counseling regarding her condition or for health maintenance advice. Please see specific information pulled into the AVS if appropriate.

## 2025-07-30 NOTE — TELEPHONE ENCOUNTER
Caller: Madelaine Nation A    Relationship to patient: Self    Best call back number: [phone]    Patient is needing: Patient is having UTI symptoms and is requesting a lab order be put in for a urine test but declined wanting an appointment for today. Patient is requesting a call back please and stated she has been having symptoms for a day.

## 2025-08-01 LAB — BACTERIA SPEC AEROBE CULT: NO GROWTH

## (undated) DEVICE — Device

## (undated) DEVICE — SOL IRR NACL 0.9PCT 3000ML

## (undated) DEVICE — SYS IRR PUMP SGL ACTN VAC SYR 10CC

## (undated) DEVICE — FIBR LASR HOLMIUM COMPAT 272MH DISP

## (undated) DEVICE — GOWN,REINFORCE,POLY,SIRUS,BREATH SLV,XLG: Brand: MEDLINE

## (undated) DEVICE — Y-TYPE TUR/BLADDER IRRIGATION SET, REGULATING CLAMP

## (undated) DEVICE — GW ZIPWIRE STD/SHFT STR TPR/3CM .038IN 150CM

## (undated) DEVICE — CYSTO PACK: Brand: MEDLINE INDUSTRIES, INC.

## (undated) DEVICE — BASIC SINGLE BASIN-LF: Brand: MEDLINE INDUSTRIES, INC.

## (undated) DEVICE — SOL IRRG H2O BG 3000ML STRL

## (undated) DEVICE — TOWEL,OR,DSP,ST,BLUE,STD,4/PK,20PK/CS: Brand: MEDLINE

## (undated) DEVICE — CATH URETRL OPEN END W/CONNECT 5F 70CM

## (undated) DEVICE — GLOVE,SURG,SENSICARE SLT,LF,PF,7.5: Brand: MEDLINE

## (undated) DEVICE — GW ZIPWIRE STD/SHFT STR TPR/3CM .035IN 150CM

## (undated) DEVICE — NITINOL WIRE WITH HYDROPHILIC TIP: Brand: SENSOR

## (undated) DEVICE — ENDOSCOPIC VALVE WITH ADAPTER.: Brand: SURSEAL® II

## (undated) DEVICE — SKIN PREP TRAY W/CHG: Brand: MEDLINE INDUSTRIES, INC.

## (undated) DEVICE — CYSTO/BLADDER IRRIGATION SET, REGULATING CLAMP

## (undated) DEVICE — GLOVE,SURG,SENSICARE SLT,LF,PF,6.5: Brand: MEDLINE

## (undated) DEVICE — NITINOL STONE RETRIEVAL BASKET: Brand: ESCAPE